# Patient Record
Sex: FEMALE | Race: WHITE | NOT HISPANIC OR LATINO | URBAN - METROPOLITAN AREA
[De-identification: names, ages, dates, MRNs, and addresses within clinical notes are randomized per-mention and may not be internally consistent; named-entity substitution may affect disease eponyms.]

---

## 2024-01-05 ENCOUNTER — HOSPITAL ENCOUNTER (EMERGENCY)
Facility: HOSPITAL | Age: 29
Discharge: HOME | End: 2024-01-05
Attending: EMERGENCY MEDICINE
Payer: MEDICAID

## 2024-01-05 VITALS
WEIGHT: 154.31 LBS | TEMPERATURE: 99 F | HEART RATE: 85 BPM | HEIGHT: 64 IN | OXYGEN SATURATION: 98 % | RESPIRATION RATE: 18 BRPM | SYSTOLIC BLOOD PRESSURE: 120 MMHG | BODY MASS INDEX: 26.34 KG/M2 | DIASTOLIC BLOOD PRESSURE: 69 MMHG

## 2024-01-05 DIAGNOSIS — J06.9 VIRAL URI WITH COUGH: ICD-10-CM

## 2024-01-05 DIAGNOSIS — J20.9 ACUTE BRONCHITIS, UNSPECIFIED ORGANISM: Primary | ICD-10-CM

## 2024-01-05 LAB
GROUP A STREP, MOLECULAR: NEGATIVE
INFLUENZA A, MOLECULAR: NEGATIVE
INFLUENZA B, MOLECULAR: NEGATIVE
SARS-COV-2 RDRP RESP QL NAA+PROBE: NEGATIVE
SPECIMEN SOURCE: NORMAL

## 2024-01-05 PROCEDURE — 99283 EMERGENCY DEPT VISIT LOW MDM: CPT

## 2024-01-05 PROCEDURE — 87651 STREP A DNA AMP PROBE: CPT

## 2024-01-05 PROCEDURE — U0002 COVID-19 LAB TEST NON-CDC: HCPCS

## 2024-01-05 PROCEDURE — 87502 INFLUENZA DNA AMP PROBE: CPT

## 2024-01-05 RX ORDER — BROMPHENIRAMINE MALEATE, PSEUDOEPHEDRINE HYDROCHLORIDE, AND DEXTROMETHORPHAN HYDROBROMIDE 2; 30; 10 MG/5ML; MG/5ML; MG/5ML
7.5 SYRUP ORAL EVERY 6 HOURS PRN
Qty: 118 ML | Refills: 0 | Status: SHIPPED | OUTPATIENT
Start: 2024-01-05 | End: 2024-01-15

## 2024-01-05 NOTE — Clinical Note
"Sierra Moralesssica" Lucrecia was seen and treated in our emergency department on 1/5/2024.  She may return to work on 01/08/2024.       If you have any questions or concerns, please don't hesitate to call.      Montrell Hickman, NP"

## 2024-01-05 NOTE — ED PROVIDER NOTES
Encounter Date: 1/5/2024       History     Chief Complaint   Patient presents with    General Illness     This note is dictated on M*Modal word recognition program.  There are word recognition mistakes and grammatical errors that are occasionally missed on review.     Sierra Singer is a 28 y.o. female presents to ER today with complaints of body aches, chills, nasal congestion, fever, coughing for the past 2 weeks on and off.  Patient reports a week ago she was treated with amoxicillin, steroids for upper respiratory infection at outside facility.  Patient reports she felt a little better however in the last 48 hours symptoms re-emerged.  Patient is currently afebrile today.    The history is provided by the patient.     Review of patient's allergies indicates:  No Known Allergies  History reviewed. No pertinent past medical history.  No past surgical history on file.  History reviewed. No pertinent family history.     Review of Systems   Constitutional:  Positive for chills, fatigue and fever.   HENT:  Positive for congestion and sore throat.    Eyes: Negative.    Respiratory:  Positive for cough.    Gastrointestinal: Negative.    Endocrine: Negative.    Genitourinary: Negative.    Musculoskeletal: Negative.    Allergic/Immunologic: Negative.    Neurological: Negative.    Hematological: Negative.    Psychiatric/Behavioral: Negative.         Physical Exam     Initial Vitals [01/05/24 1405]   BP Pulse Resp Temp SpO2   120/69 85 18 98.9 °F (37.2 °C) 98 %      MAP       --         Physical Exam    Constitutional: She appears well-developed and well-nourished. She is not diaphoretic. No distress.   HENT:   Right Ear: External ear normal.   Left Ear: External ear normal.   Eyes: Pupils are equal, round, and reactive to light. Right eye exhibits no discharge. Left eye exhibits no discharge.   Neck: Neck supple.   Normal range of motion.  Cardiovascular:  Normal rate, regular rhythm and normal heart sounds.     Exam reveals  no gallop and no friction rub.       No murmur heard.  Pulmonary/Chest: Breath sounds normal. No respiratory distress. She has no rhonchi. She exhibits no tenderness.   Abdominal: Abdomen is soft.   Musculoskeletal:         General: No edema.      Cervical back: Normal range of motion and neck supple.     Neurological: She is alert and oriented to person, place, and time. She displays normal reflexes. No cranial nerve deficit. GCS score is 15. GCS eye subscore is 4. GCS verbal subscore is 5. GCS motor subscore is 6.   Skin: Capillary refill takes less than 2 seconds. No rash noted. No erythema.   Psychiatric: She has a normal mood and affect. Her behavior is normal. Judgment and thought content normal.         ED Course   Procedures  Labs Reviewed   INFLUENZA A & B BY MOLECULAR   GROUP A STREP, MOLECULAR   SARS-COV-2 RNA AMPLIFICATION, QUAL          Imaging Results    None          Medications - No data to display  Medical Decision Making  Differential diagnosis include COVID-19, influenza, strep, acute bacterial upper respiratory infection, viral upper respiratory infection, acute bronchitis     Patient tested negative for strep, COVID-19, influenza today.  Patient's symptoms are consistent with bronchitis versus viral upper respiratory infection.  Patient was recently treated with amoxicillin with no relief of symptoms leading to diagnosis being more consistent with viral infection.  Will treat patient symptomatically and provide Bromfed to help with cough.  Patient instructed to take Tylenol and Motrin to help with body aches, headaches, sore throat, fevers.  Patient instructed to follow-up package directions.    Patient stable at time of discharge in no acute distress.  No life-threatening illnesses were found during ER visit today.  Patient was instructed to follow-up with PCP or other recommended specialist within the next 48-72 hours.  Patient was instructed to return to ER immediately for any worsening or  concerning symptoms.  All discharge instructions discussed with patient, and patient agrees to comply with discharge instructions given today.     Risk  Prescription drug management.                                      Clinical Impression:  Final diagnoses:  [J06.9] Viral URI with cough  [J20.9] Acute bronchitis, unspecified organism (Primary)          ED Disposition Condition    Discharge Stable          ED Prescriptions       Medication Sig Dispense Start Date End Date Auth. Provider    brompheniramine-pseudoeph-DM (BROMFED DM) 2-30-10 mg/5 mL Syrp Take 7.5 mLs by mouth every 6 (six) hours as needed (cough). Dx CODE J20.9 118 mL 1/5/2024 1/15/2024 Montrell Hickman, ANIL          Follow-up Information    None          Montrell Hickman, NP  01/05/24 9800

## 2024-07-12 ENCOUNTER — HOSPITAL ENCOUNTER (EMERGENCY)
Facility: HOSPITAL | Age: 29
Discharge: HOME | End: 2024-07-12
Attending: FAMILY MEDICINE
Payer: MEDICAID

## 2024-07-12 VITALS
WEIGHT: 140.56 LBS | SYSTOLIC BLOOD PRESSURE: 117 MMHG | HEART RATE: 53 BPM | BODY MASS INDEX: 24 KG/M2 | OXYGEN SATURATION: 100 % | RESPIRATION RATE: 22 BRPM | DIASTOLIC BLOOD PRESSURE: 76 MMHG | TEMPERATURE: 98 F | HEIGHT: 64 IN

## 2024-07-12 DIAGNOSIS — R07.89 ATYPICAL CHEST PAIN: Primary | ICD-10-CM

## 2024-07-12 DIAGNOSIS — R00.1 BRADYCARDIA: ICD-10-CM

## 2024-07-12 DIAGNOSIS — R07.9 CHEST PAIN: ICD-10-CM

## 2024-07-12 LAB
ALBUMIN SERPL BCP-MCNC: 4.5 G/DL (ref 3.5–5.2)
ALP SERPL-CCNC: 86 U/L (ref 55–135)
ALT SERPL W/O P-5'-P-CCNC: 10 U/L (ref 10–44)
ANION GAP SERPL CALC-SCNC: 8 MMOL/L (ref 8–16)
AST SERPL-CCNC: 15 U/L (ref 10–40)
B-HCG UR QL: NEGATIVE
BACTERIA #/AREA URNS HPF: ABNORMAL /HPF
BASOPHILS # BLD AUTO: 0.02 K/UL (ref 0–0.2)
BASOPHILS NFR BLD: 0.3 % (ref 0–1.9)
BILIRUB SERPL-MCNC: 0.8 MG/DL (ref 0.1–1)
BILIRUB UR QL STRIP: NEGATIVE
BUN SERPL-MCNC: 10 MG/DL (ref 6–20)
CALCIUM SERPL-MCNC: 9.6 MG/DL (ref 8.7–10.5)
CHLORIDE SERPL-SCNC: 105 MMOL/L (ref 95–110)
CLARITY UR: CLEAR
CO2 SERPL-SCNC: 27 MMOL/L (ref 23–29)
COLOR UR: YELLOW
CREAT SERPL-MCNC: 0.7 MG/DL (ref 0.5–1.4)
D DIMER PPP IA.FEU-MCNC: 0.31 MG/L FEU
DIFFERENTIAL METHOD BLD: NORMAL
EOSINOPHIL # BLD AUTO: 0.1 K/UL (ref 0–0.5)
EOSINOPHIL NFR BLD: 1 % (ref 0–8)
ERYTHROCYTE [DISTWIDTH] IN BLOOD BY AUTOMATED COUNT: 12.2 % (ref 11.5–14.5)
EST. GFR  (NO RACE VARIABLE): >60 ML/MIN/1.73 M^2
GLUCOSE SERPL-MCNC: 88 MG/DL (ref 70–110)
GLUCOSE UR QL STRIP: NEGATIVE
HCT VFR BLD AUTO: 40.1 % (ref 37–48.5)
HGB BLD-MCNC: 13.9 G/DL (ref 12–16)
HGB UR QL STRIP: NEGATIVE
HYALINE CASTS #/AREA URNS LPF: 1 /LPF
IMM GRANULOCYTES # BLD AUTO: 0.01 K/UL (ref 0–0.04)
IMM GRANULOCYTES NFR BLD AUTO: 0.2 % (ref 0–0.5)
INFLUENZA A, MOLECULAR: NEGATIVE
INFLUENZA B, MOLECULAR: NEGATIVE
KETONES UR QL STRIP: NEGATIVE
LEUKOCYTE ESTERASE UR QL STRIP: ABNORMAL
LYMPHOCYTES # BLD AUTO: 1.6 K/UL (ref 1–4.8)
LYMPHOCYTES NFR BLD: 24.9 % (ref 18–48)
MCH RBC QN AUTO: 30.8 PG (ref 27–31)
MCHC RBC AUTO-ENTMCNC: 34.7 G/DL (ref 32–36)
MCV RBC AUTO: 89 FL (ref 82–98)
MICROSCOPIC COMMENT: ABNORMAL
MONOCYTES # BLD AUTO: 0.4 K/UL (ref 0.3–1)
MONOCYTES NFR BLD: 6.6 % (ref 4–15)
NEUTROPHILS # BLD AUTO: 4.2 K/UL (ref 1.8–7.7)
NEUTROPHILS NFR BLD: 67 % (ref 38–73)
NITRITE UR QL STRIP: NEGATIVE
NRBC BLD-RTO: 0 /100 WBC
OHS QRS DURATION: 72 MS
OHS QTC CALCULATION: 391 MS
PH UR STRIP: 8 [PH] (ref 5–8)
PLATELET # BLD AUTO: 252 K/UL (ref 150–450)
PMV BLD AUTO: 9.5 FL (ref 9.2–12.9)
POTASSIUM SERPL-SCNC: 4 MMOL/L (ref 3.5–5.1)
PROT SERPL-MCNC: 7.6 G/DL (ref 6–8.4)
PROT UR QL STRIP: NEGATIVE
RBC # BLD AUTO: 4.51 M/UL (ref 4–5.4)
RBC #/AREA URNS HPF: 0 /HPF (ref 0–4)
SARS-COV-2 RDRP RESP QL NAA+PROBE: NEGATIVE
SODIUM SERPL-SCNC: 140 MMOL/L (ref 136–145)
SP GR UR STRIP: 1.02 (ref 1–1.03)
SPECIMEN SOURCE: NORMAL
SQUAMOUS #/AREA URNS HPF: 12 /HPF
TROPONIN I SERPL DL<=0.01 NG/ML-MCNC: <0.006 NG/ML (ref 0–0.03)
TROPONIN I SERPL DL<=0.01 NG/ML-MCNC: <0.006 NG/ML (ref 0–0.03)
URN SPEC COLLECT METH UR: ABNORMAL
UROBILINOGEN UR STRIP-ACNC: NEGATIVE EU/DL
WBC # BLD AUTO: 6.22 K/UL (ref 3.9–12.7)
WBC #/AREA URNS HPF: 3 /HPF (ref 0–5)

## 2024-07-12 PROCEDURE — 85379 FIBRIN DEGRADATION QUANT: CPT | Performed by: FAMILY MEDICINE

## 2024-07-12 PROCEDURE — 93010 ELECTROCARDIOGRAM REPORT: CPT | Mod: ,,, | Performed by: INTERNAL MEDICINE

## 2024-07-12 PROCEDURE — 84484 ASSAY OF TROPONIN QUANT: CPT | Performed by: FAMILY MEDICINE

## 2024-07-12 PROCEDURE — 80053 COMPREHEN METABOLIC PANEL: CPT | Performed by: FAMILY MEDICINE

## 2024-07-12 PROCEDURE — 99284 EMERGENCY DEPT VISIT MOD MDM: CPT | Mod: 25

## 2024-07-12 PROCEDURE — 93005 ELECTROCARDIOGRAM TRACING: CPT

## 2024-07-12 PROCEDURE — U0002 COVID-19 LAB TEST NON-CDC: HCPCS | Performed by: FAMILY MEDICINE

## 2024-07-12 PROCEDURE — 81025 URINE PREGNANCY TEST: CPT | Performed by: FAMILY MEDICINE

## 2024-07-12 PROCEDURE — 85025 COMPLETE CBC W/AUTO DIFF WBC: CPT | Performed by: FAMILY MEDICINE

## 2024-07-12 PROCEDURE — 87502 INFLUENZA DNA AMP PROBE: CPT | Performed by: FAMILY MEDICINE

## 2024-07-12 PROCEDURE — 81000 URINALYSIS NONAUTO W/SCOPE: CPT | Performed by: FAMILY MEDICINE

## 2024-07-12 PROCEDURE — 94760 N-INVAS EAR/PLS OXIMETRY 1: CPT

## 2024-07-12 NOTE — CONSULTS
[facility] - Emergency Dept  Cardiology  Consult Note    Patient Name: Sierra Singer  MRN: [id number]  Admission Date: 7/12/2024  Hospital Length of Stay: 0 days  Code Status: No Order   Consulting Provider: Shawn Gilbert NP  Primary Care Physician: No, Primary Doctor  Principal Problem:<principal problem not specified>      Inpatient consult to Cardiology-C[facility]  Consult performed by: Shawn Gilbert NP  Consult ordered by: Mi Peraza MD        Subjective:     Chief Complaint:  CP    HPI:  28-year-old female with past medical history of irregular heartbeat presents to emergency department after episode of syncope yesterday and continuous chest pain.  Labs done rather benign.  Second troponin pending.  Patient takes metoprolol of unknown dose for her history of irregular heartbeat.  Cis asked to evaluate.    No past medical history on file.    No past surgical history on file.    Review of patient's allergies indicates:  No Known Allergies    No current facility-administered medications on file prior to encounter.     No current outpatient medications on file prior to encounter.     Family History    None       Tobacco Use    Smoking status: Not on file    Smokeless tobacco: Not on file   Substance and Sexual Activity    Alcohol use: Not on file    Drug use: Not on file    Sexual activity: Not on file     Review of Systems   Constitutional: Negative.   HENT: Negative.     Cardiovascular:  Positive for chest pain.   Respiratory: Negative.     Skin: Negative.    Musculoskeletal: Negative.    Gastrointestinal: Negative.    Genitourinary: Negative.    Neurological: Negative.    Psychiatric/Behavioral: Negative.       Objective:     Vital Signs (Most Recent):  Temp: 98.2 °F (36.8 °C) (07/12/24 1039)  Pulse: (!) 52 (07/12/24 1345)  Resp: 17 (07/12/24 1345)  BP: 112/79 (07/12/24 1345)  SpO2: 100 % (07/12/24 1345) Vital Signs (24h Range):  Temp:  [98.2 °F (36.8 °C)] 98.2 °F (36.8 °C)  Pulse:  [49-68] 52  Resp:   [17-20] 17  SpO2:  [99 %-100 %] 100 %  BP: (112-136)/(75-93) 112/79     Weight: 63.7 kg (140 lb 8.7 oz)  Body mass index is 24.12 kg/m².    SpO2: 100 %       No intake or output data in the 24 hours ending 07/12/24 1444    Lines/Drains/Airways       Peripheral Intravenous Line  Duration                  Peripheral IV - Single Lumen 07/12/24 1156 18 G Anterior;Proximal;Right Forearm <1 day                    Physical Exam  HENT:      Nose: Nose normal.      Mouth/Throat:      Mouth: Mucous membranes are moist.   Cardiovascular:      Rate and Rhythm: Regular rhythm. Bradycardia present.      Pulses: Normal pulses.      Heart sounds: Normal heart sounds.   Pulmonary:      Effort: Pulmonary effort is normal.      Breath sounds: Normal breath sounds.   Abdominal:      General: Abdomen is flat.   Musculoskeletal:         General: Normal range of motion.      Cervical back: Normal range of motion.   Skin:     General: Skin is warm and dry.      Capillary Refill: Capillary refill takes less than 2 seconds.   Neurological:      Mental Status: She is alert and oriented to person, place, and time.   Psychiatric:         Mood and Affect: Mood normal.         Significant Labs: BMP:   Recent Labs   Lab 07/12/24  1123   GLU 88      K 4.0      CO2 27   BUN 10   CREATININE 0.7   CALCIUM 9.6   , CMP   Recent Labs   Lab 07/12/24  1123      K 4.0      CO2 27   GLU 88   BUN 10   CREATININE 0.7   CALCIUM 9.6   PROT 7.6   ALBUMIN 4.5   BILITOT 0.8   ALKPHOS 86   AST 15   ALT 10   ANIONGAP 8   , CBC   Recent Labs   Lab 07/12/24  1123   WBC 6.22   HGB 13.9   HCT 40.1      , and Troponin   Recent Labs   Lab 07/12/24  1123   TROPONINI <0.006         Assessment and Plan:     There are no hospital problems to display for this patient.      VTE Risk Mitigation (From admission, onward)      None            Diagnosis:   Chest pain   Back pain   Sinus bradycardia     Plan:   Patient's chest pain is somewhat atypical  as it is reproducible with deep inspiration and movement.    However, complaints of substernal tightness.    If 2nd set of troponin negative, patient can follow-up as an outpatient.    We will arrange for a 48 hour Holter monitor upon discharge to assess for degree of bradycardia as her heart rate did get into the 40s periodically during her ER visit.  We will also schedule ETT given chest pain.    Echocardiogram to assess LV function and valvular structure.    She can follow-up with Dr Zhang after above testing for results.    Shawn Gilbert NP  Cardiology   [facility] - Emergency Dept

## 2024-07-12 NOTE — Clinical Note
"Sierra"Romina Singer was seen and treated in our emergency department on 7/12/2024.  She may return to work on 07/15/2024.       If you have any questions or concerns, please don't hesitate to call.      Mi Peraza MD"

## 2024-07-12 NOTE — ED PROVIDER NOTES
Encounter Date: 7/12/2024       History     Chief Complaint   Patient presents with    Chest Pain     Patient to ER CC of a chest pressure that started about a hour ago, states its starting to make her SOB      HPI  28 year old female with a history of arrhythmia presents to the ED with chest pressure that started approximately 1 hour prior to presentation.  Patient states that the symptoms were starting to make her feel short of breath.  She does state that she is on metoprolol for an arrhythmia which was started in [place].  She does state that the shortness of breath has minimally improved however she has still having the chest pressure.  She denies any nausea, vomiting, diarrhea.  No fevers, chills, known sick contacts.  Review of patient's allergies indicates:  No Known Allergies  No past medical history on file.  No past surgical history on file.  No family history on file.     Review of Systems   Constitutional:  Negative for chills and fever.   HENT:  Negative for sore throat.    Eyes:  Negative for visual disturbance.   Respiratory:  Positive for shortness of breath. Negative for wheezing.    Cardiovascular:  Positive for chest pain.   Gastrointestinal:  Negative for abdominal pain, diarrhea, nausea and vomiting.   Genitourinary:  Negative for dysuria.   Musculoskeletal:  Negative for back pain.   Neurological:  Negative for facial asymmetry.   Psychiatric/Behavioral:  Negative for behavioral problems.    All other systems reviewed and are negative.      Physical Exam     Initial Vitals [07/12/24 1039]   BP Pulse Resp Temp SpO2   (!) 136/93 68 18 98.2 °F (36.8 °C) 99 %      MAP       --         Physical Exam    Constitutional: She appears well-developed and well-nourished. She is not diaphoretic. No distress.   HENT:   Head: Normocephalic and atraumatic.   Right Ear: External ear normal.   Left Ear: External ear normal.   Eyes: EOM are normal. Pupils are equal, round, and reactive to light.   Neck:    Normal range of motion.  Cardiovascular:            S1, S2, no murmurs, bradycardia   Pulmonary/Chest: No respiratory distress. She has no wheezes. She has no rales.   Abdominal: Abdomen is soft. She exhibits no distension. There is no abdominal tenderness.   Musculoskeletal:         General: Normal range of motion.      Cervical back: Normal range of motion.     Neurological: She is alert and oriented to person, place, and time. GCS score is 15. GCS eye subscore is 4. GCS verbal subscore is 5. GCS motor subscore is 6.   Skin: Skin is warm and dry. No rash noted.   Psychiatric: She has a normal mood and affect.         ED Course   Procedures  Labs Reviewed   URINALYSIS, REFLEX TO URINE CULTURE - Abnormal; Notable for the following components:       Result Value    Leukocytes, UA 1+ (*)     All other components within normal limits    Narrative:     Specimen Source->Urine   URINALYSIS MICROSCOPIC - Abnormal; Notable for the following components:    Bacteria Few (*)     All other components within normal limits    Narrative:     Specimen Source->Urine   INFLUENZA A & B BY MOLECULAR   SARS-COV-2 RNA AMPLIFICATION, QUAL   CBC W/ AUTO DIFFERENTIAL   COMPREHENSIVE METABOLIC PANEL   TROPONIN I   PREGNANCY TEST, URINE RAPID    Narrative:     Specimen Source->Urine   D DIMER, QUANTITATIVE   TROPONIN I        ECG Results              EKG 12-lead (Final result)        Collection Time Result Time QRS Duration OHS QTC Calculation    07/12/24 10:41:10 07/12/24 13:19:33 72 391                     Final result by Interface, [facility] (07/12/24 13:19:44)                   Narrative:    Test Reason : R07.9    Vent. Rate : 062 BPM     Atrial Rate : 062 BPM     P-R Int : 132 ms          QRS Dur : 072 ms      QT Int : 386 ms       P-R-T Axes : 056 065 071 degrees     QTc Int : 391 ms    Normal sinus rhythm with sinus arrhythmia  Normal ECG  No previous ECGs available  Confirmed by Dewey Garrison MD ([id number]) on 7/12/2024 1:19:28  PM    Referred By: [email or web address]   SELF           Confirmed By:Dewey Garrison MD                                  Imaging Results              X-Ray Chest 1 View (Final result)  Result time 07/12/24 12:22:39      Final result by Alber Pollack MD (07/12/24 12:22:39)                   Impression:      No active cardiopulmonary process.      Electronically signed by: Alber Pollack MD  Date:    07/12/2024  Time:    12:22               Narrative:    EXAMINATION:  XR CHEST 1 VIEW    CLINICAL HISTORY:  Chest pain, unspecified    TECHNIQUE:  Single frontal view of the chest was performed.    COMPARISON:  None    FINDINGS:  The cardiomediastinal silhouette is within normal limits.  The lungs are well expanded without consolidation or pleural effusion.  There is dextrocurvature of the thoracic spine.                                       Medications - No data to display  Medical Decision Making  28-year-old female who presents to the ED with chest pain proximally 1 hour prior to presentation as well as shortness of breath.  Patient with known history of arrhythmia and is on metoprolol.  Patient was seen by Cardiology who agrees with 2nd set of cardiac enzymes which were both negative troponins.  Labs reviewed.  EKG reviewed.  Chest x-ray reviewed.  Patient seen by Sae KENDALL nurse practitioner.  She will be discharged home with immediately following in their clinic today for Holter monitor placement.  Patient understands plan of care and discharge instructions.  All questions answered prior to discharge home.    Amount and/or Complexity of Data Reviewed  Labs: ordered.  Radiology: ordered.               ED Course as of 07/12/24 1546   Fri Jul 12, 2024   1110 EKG  Normal sinus rhythm with sinus arrhythmia  Heart rate 62  QRS normal  No acute ST elevation  EKG reviewed interpreted by myself [FP]      ED Course User Index  [FP] Mi Peraza MD                           Clinical Impression:  Final  diagnoses:  [R07.9] Chest pain  [R07.89] Atypical chest pain (Primary)  [R00.1] Bradycardia          ED Disposition Condition    Discharge Stable          ED Prescriptions    None       Follow-up Information       Follow up With Specialties Details Why Contact Info    Juan Antonio Zhang MD Cardiology Today please go immediately upon discharge [address] [place] [address]  [phone]               Mi Peraza MD  07/12/24 8222

## 2024-07-12 NOTE — DISCHARGE INSTRUCTIONS
Please return the ED with any worsened chest pain, shortness of breath.  Please follow-up immediately with Dr. Zhang's office across the street.  They will be providing you with a cardiac monitor.

## 2024-07-17 ENCOUNTER — OFFICE VISIT (OUTPATIENT)
Dept: CARDIOLOGY | Facility: CLINIC | Age: 29
End: 2024-07-17
Payer: MEDICAID

## 2024-07-17 VITALS
OXYGEN SATURATION: 99 % | HEIGHT: 64 IN | HEART RATE: 62 BPM | SYSTOLIC BLOOD PRESSURE: 122 MMHG | RESPIRATION RATE: 18 BRPM | DIASTOLIC BLOOD PRESSURE: 78 MMHG | WEIGHT: 143 LBS | BODY MASS INDEX: 24.41 KG/M2

## 2024-07-17 DIAGNOSIS — R55 SYNCOPE AND COLLAPSE: ICD-10-CM

## 2024-07-17 DIAGNOSIS — R07.9 CHEST PAIN, UNSPECIFIED TYPE: ICD-10-CM

## 2024-07-17 DIAGNOSIS — Z82.49 FAMILY HISTORY OF CORONARY ARTERY DISEASE IN FATHER: ICD-10-CM

## 2024-07-17 DIAGNOSIS — I49.8 SINUS ARRHYTHMIA SEEN ON ELECTROCARDIOGRAPHY: Primary | ICD-10-CM

## 2024-07-17 PROCEDURE — 99213 OFFICE O/P EST LOW 20 MIN: CPT | Mod: PBBFAC | Performed by: NURSE PRACTITIONER

## 2024-07-17 PROCEDURE — 3074F SYST BP LT 130 MM HG: CPT | Mod: CPTII,,, | Performed by: NURSE PRACTITIONER

## 2024-07-17 PROCEDURE — 1160F RVW MEDS BY RX/DR IN RCRD: CPT | Mod: CPTII,,, | Performed by: NURSE PRACTITIONER

## 2024-07-17 PROCEDURE — 1159F MED LIST DOCD IN RCRD: CPT | Mod: CPTII,,, | Performed by: NURSE PRACTITIONER

## 2024-07-17 PROCEDURE — 3008F BODY MASS INDEX DOCD: CPT | Mod: CPTII,,, | Performed by: NURSE PRACTITIONER

## 2024-07-17 PROCEDURE — 99999 PR PBB SHADOW E&M-EST. PATIENT-LVL III: CPT | Mod: PBBFAC,,, | Performed by: NURSE PRACTITIONER

## 2024-07-17 PROCEDURE — 3078F DIAST BP <80 MM HG: CPT | Mod: CPTII,,, | Performed by: NURSE PRACTITIONER

## 2024-07-17 PROCEDURE — 99204 OFFICE O/P NEW MOD 45 MIN: CPT | Mod: S$PBB,,, | Performed by: NURSE PRACTITIONER

## 2024-07-17 NOTE — PROGRESS NOTES
"[facility]    CC: Arrhythmias     Patient ID: Sierra Singer is a 28 y.o. female with a past medical history of atypical chest pain      HPI  ED visit for atypical chest pain x2 years  Nonexertional chest pain   Associated with lightheadeness and dizziness, back pain, nausea, tingling of toes and fingers     ER note 7/2024  "28 year old female with a history of arrhythmia presents to the ED with chest pressure that started approximately 1 hour prior to presentation.  Patient states that the symptoms were starting to make her feel short of breath.  She does state that she is on metoprolol for an arrhythmia which was started in [place].  She does state that the shortness of breath has minimally improved however she has still having the chest pressure.  She denies any nausea, vomiting, diarrhea.  No fevers, chills, known sick contacts."    Medical Decision Making  28-year-old female who presents to the ED with chest pain proximally 1 hour prior to presentation as well as shortness of breath.  Patient with known history of arrhythmia and is on metoprolol.  Patient was seen by Cardiology who agrees with 2nd set of cardiac enzymes which were both negative troponins.  Labs reviewed.  EKG reviewed.  Chest x-ray reviewed.  Patient seen by Sae KENDALL nurse practitioner.  She will be discharged home with immediately following in their clinic today for Holter monitor placement.  Patient understands plan of care and discharge instructions.  All questions answered prior to discharge home.    Also reports syncope   Dizziness, nausea, lightheadedness prior   Passed out at work  Reports weight loss for the last 2 months, unintentional  No meds   Nonsmoker     Wore a monitor; turned in 2 days ago; not resulted yet    FH- dad- CAD with PCI      No past medical history on file.  No past surgical history on file.  Social History     Socioeconomic History    Marital status: Legally      No family history " "on file.    Review of patient's allergies indicates:   Allergen Reactions    Jarod Hives, Itching and Rash               Review of Systems   Constitutional: Positive for weight loss.   Cardiovascular:  Positive for chest pain, irregular heartbeat, palpitations and syncope. Negative for claudication, cyanosis, dyspnea on exertion, leg swelling, near-syncope, orthopnea and paroxysmal nocturnal dyspnea.   Respiratory: Negative.     Skin: Negative.    Musculoskeletal: Negative.        Vitals:    07/17/24 1009   BP: 122/78   Pulse: 62   Resp: 18   SpO2: 99%   Weight: 64.9 kg (143 lb)   Height: 5' 4" (1.626 m)          Physical Exam  Cardiovascular:      Rate and Rhythm: Normal rate and regular rhythm.      Pulses: Normal pulses.      Heart sounds: Normal heart sounds.   Pulmonary:      Effort: Pulmonary effort is normal.      Breath sounds: Normal breath sounds.   Skin:     General: Skin is warm.      Capillary Refill: Capillary refill takes less than 2 seconds.   Neurological:      Mental Status: She is alert and oriented to person, place, and time.   Psychiatric:         Mood and Affect: Mood normal.           Labs:  Most Recent Data  CBC:   Lab Results   Component Value Date    WBC 6.22 07/12/2024    HGB 13.9 07/12/2024    HCT 40.1 07/12/2024     07/12/2024    MCV 89 07/12/2024    RDW 12.2 07/12/2024     BMP:   Lab Results   Component Value Date     07/12/2024    K 4.0 07/12/2024     07/12/2024    CO2 27 07/12/2024    BUN 10 07/12/2024    CREATININE 0.7 07/12/2024    GLU 88 07/12/2024    CALCIUM 9.6 07/12/2024     LFTS;   Lab Results   Component Value Date    PROT 7.6 07/12/2024    ALBUMIN 4.5 07/12/2024    BILITOT 0.8 07/12/2024    AST 15 07/12/2024    ALKPHOS 86 07/12/2024    ALT 10 07/12/2024     COAGS: No results found for: "INR", "PROTIME", "PTT"  FLP: No results found for: "CHOL", "HDL", "LDLCALC", "TRIG", "CHOLHDL"  CARDIAC:   Lab Results   Component Value Date    TROPONINI <0.006 07/12/2024 "       Assessment/Plan:  Problem List Items Addressed This Visit    None  Visit Diagnoses       Sinus arrhythmia seen on electrocardiography    -  Primary    Syncope and collapse        Chest pain, unspecified type        Family history of coronary artery disease in father                Exercise stress given chest pain and syncopal episodes while at work  Echocardiogram to assess LV function and valvular structure     Follow-up 4 weeks      Total duration of face to face visit time 30 minutes.  Total time spent counseling greater than fifty percent of total visit time.  Counseling included discussion regarding imaging findings, diagnosis, possibilities, treatment options, risks and benefits.  The patient had many questions regarding the options and long-term effects.    Shayy Gaston, HENRYP-C  Cardiology Clinic  [facility]

## 2024-07-22 ENCOUNTER — HOSPITAL ENCOUNTER (OUTPATIENT)
Dept: PULMONOLOGY | Facility: HOSPITAL | Age: 29
Discharge: HOME | End: 2024-07-22
Attending: NURSE PRACTITIONER
Payer: MEDICAID

## 2024-07-22 DIAGNOSIS — I49.8 SINUS ARRHYTHMIA SEEN ON ELECTROCARDIOGRAPHY: ICD-10-CM

## 2024-07-22 DIAGNOSIS — R55 SYNCOPE AND COLLAPSE: ICD-10-CM

## 2024-07-22 DIAGNOSIS — R07.9 CHEST PAIN, UNSPECIFIED TYPE: ICD-10-CM

## 2024-07-22 PROCEDURE — 93306 TTE W/DOPPLER COMPLETE: CPT | Mod: 26,,, | Performed by: INTERNAL MEDICINE

## 2024-07-22 PROCEDURE — 93306 TTE W/DOPPLER COMPLETE: CPT

## 2024-07-22 PROCEDURE — 93016 CV STRESS TEST SUPVJ ONLY: CPT | Mod: ,,, | Performed by: INTERNAL MEDICINE

## 2024-07-22 PROCEDURE — 93018 CV STRESS TEST I&R ONLY: CPT | Mod: ,,, | Performed by: INTERNAL MEDICINE

## 2024-07-22 PROCEDURE — 93017 CV STRESS TEST TRACING ONLY: CPT

## 2024-07-23 LAB
CV STRESS BASE HR: 78 BPM
DIASTOLIC BLOOD PRESSURE: 62 MMHG
OHS CV CPX 1 MINUTE RECOVERY HEART RATE: 137 BPM
OHS CV CPX 85 PERCENT MAX PREDICTED HEART RATE MALE: 163
OHS CV CPX ESTIMATED METS: 9
OHS CV CPX MAX PREDICTED HEART RATE: 192
OHS CV CPX PATIENT IS FEMALE: 1
OHS CV CPX PATIENT IS MALE: 0
OHS CV CPX PEAK DIASTOLIC BLOOD PRESSURE: 68 MMHG
OHS CV CPX PEAK HEAR RATE: 176 BPM
OHS CV CPX PEAK RATE PRESSURE PRODUCT: NORMAL
OHS CV CPX PEAK SYSTOLIC BLOOD PRESSURE: 161 MMHG
OHS CV CPX PERCENT MAX PREDICTED HEART RATE ACHIEVED: 97
OHS CV CPX RATE PRESSURE PRODUCT PRESENTING: 9360
RA PRESSURE ESTIMATED: 3 MMHG
STRESS ECHO POST EXERCISE DUR MIN: 6 MINUTES
STRESS ECHO POST EXERCISE DUR SEC: 33 SECONDS
SYSTOLIC BLOOD PRESSURE: 120 MMHG

## 2024-07-25 ENCOUNTER — TELEPHONE (OUTPATIENT)
Dept: CARDIOLOGY | Facility: CLINIC | Age: 29
End: 2024-07-25
Payer: MEDICAID

## 2024-07-25 NOTE — TELEPHONE ENCOUNTER
----- Message from Shayy Gaston NP sent at 7/25/2024  3:17 PM CDT -----  Your echocardiogram is normal.

## 2024-07-25 NOTE — TELEPHONE ENCOUNTER
----- Message from Shayy Gaston NP sent at 7/25/2024  3:18 PM CDT -----  Stress testing is normal.

## 2024-08-19 PROBLEM — Z82.49 FAMILY HISTORY OF CORONARY ARTERY DISEASE IN FATHER: Status: ACTIVE | Noted: 2024-08-19

## 2024-08-19 PROBLEM — R55 SYNCOPE AND COLLAPSE: Status: ACTIVE | Noted: 2024-08-19

## 2024-08-19 PROBLEM — R07.9 CHEST PAIN: Status: ACTIVE | Noted: 2024-08-19

## 2024-08-19 PROBLEM — I49.8 SINUS ARRHYTHMIA SEEN ON ELECTROCARDIOGRAPHY: Status: ACTIVE | Noted: 2024-08-19

## 2024-08-21 ENCOUNTER — OFFICE VISIT (OUTPATIENT)
Dept: CARDIOLOGY | Facility: CLINIC | Age: 29
End: 2024-08-21
Payer: MEDICAID

## 2024-08-21 VITALS
BODY MASS INDEX: 24.59 KG/M2 | OXYGEN SATURATION: 98 % | WEIGHT: 144 LBS | DIASTOLIC BLOOD PRESSURE: 69 MMHG | RESPIRATION RATE: 18 BRPM | HEIGHT: 64 IN | SYSTOLIC BLOOD PRESSURE: 109 MMHG | HEART RATE: 60 BPM

## 2024-08-21 DIAGNOSIS — R07.9 CHEST PAIN, UNSPECIFIED TYPE: Primary | ICD-10-CM

## 2024-08-21 DIAGNOSIS — F41.9 ANXIETY: ICD-10-CM

## 2024-08-21 DIAGNOSIS — Z82.49 FAMILY HISTORY OF CORONARY ARTERY DISEASE IN FATHER: ICD-10-CM

## 2024-08-21 DIAGNOSIS — I49.8 SINUS ARRHYTHMIA SEEN ON ELECTROCARDIOGRAPHY: ICD-10-CM

## 2024-08-21 DIAGNOSIS — R55 SYNCOPE AND COLLAPSE: ICD-10-CM

## 2024-08-21 PROCEDURE — 3008F BODY MASS INDEX DOCD: CPT | Mod: CPTII,,, | Performed by: NURSE PRACTITIONER

## 2024-08-21 PROCEDURE — 3078F DIAST BP <80 MM HG: CPT | Mod: CPTII,,, | Performed by: NURSE PRACTITIONER

## 2024-08-21 PROCEDURE — 1160F RVW MEDS BY RX/DR IN RCRD: CPT | Mod: CPTII,,, | Performed by: NURSE PRACTITIONER

## 2024-08-21 PROCEDURE — 99213 OFFICE O/P EST LOW 20 MIN: CPT | Mod: PBBFAC | Performed by: NURSE PRACTITIONER

## 2024-08-21 PROCEDURE — 99999 PR PBB SHADOW E&M-EST. PATIENT-LVL III: CPT | Mod: PBBFAC,,, | Performed by: NURSE PRACTITIONER

## 2024-08-21 PROCEDURE — 1159F MED LIST DOCD IN RCRD: CPT | Mod: CPTII,,, | Performed by: NURSE PRACTITIONER

## 2024-08-21 PROCEDURE — 99214 OFFICE O/P EST MOD 30 MIN: CPT | Mod: S$PBB,,, | Performed by: NURSE PRACTITIONER

## 2024-08-21 PROCEDURE — 3074F SYST BP LT 130 MM HG: CPT | Mod: CPTII,,, | Performed by: NURSE PRACTITIONER

## 2024-08-21 NOTE — PROGRESS NOTES
"   Cardiology Clinic note    Subjective:   Patient ID:  Sierra Singer is a 28 y.o. female who presents for follow-up of atypical chest pain     HPI:   Sierra Singer  has no past medical history on file.    Hx atypical chest pain x2 years  Nonexertional chest pain   Associated with lightheadeness and dizziness, back pain, nausea, tingling of toes and fingers      ER note 7/2024  "28 year old female with a history of arrhythmia presents to the ED with chest pressure that started approximately 1 hour prior to presentation.  Patient states that the symptoms were starting to make her feel short of breath.  She does state that she is on metoprolol for an arrhythmia which was started in [place].  She does state that the shortness of breath has minimally improved however she has still having the chest pressure.  She denies any nausea, vomiting, diarrhea.  No fevers, chills, known sick contacts."     Medical Decision Making  28-year-old female who presents to the ED with chest pain proximally 1 hour prior to presentation as well as shortness of breath.  Patient with known history of arrhythmia and is on metoprolol.  Patient was seen by Cardiology who agrees with 2nd set of cardiac enzymes which were both negative troponins.  Labs reviewed.  EKG reviewed.  Chest x-ray reviewed.  Patient seen by Sae KENDALL nurse practitioner.  She will be discharged home with immediately following in their clinic today for Holter monitor placement.  Patient understands plan of care and discharge instructions.  All questions answered prior to discharge home.    Echocardiogram and exercise stress test negative July 2024     Also reports syncope   Dizziness, nausea, lightheadedness prior   Passed out at work  Reports weight loss for the last 2 months, unintentional  No meds   Nonsmoker   Reports significant stressors     Wore a monitor with [facility] 7/2024; normal    FH- dad- CAD with PCI      Patient Active Problem List    Diagnosis Date " "Noted    Anxiety 08/21/2024    Syncope and collapse 08/19/2024    Chest pain 08/19/2024    Family history of coronary artery disease in father 08/19/2024    Sinus arrhythmia seen on electrocardiography 08/19/2024       Patient's Medications    No medications on file        Review of Systems   Constitutional: Positive for weight loss.   Cardiovascular:  Positive for chest pain, irregular heartbeat, palpitations and syncope. Negative for claudication, cyanosis, dyspnea on exertion, leg swelling, near-syncope, orthopnea and paroxysmal nocturnal dyspnea.   Respiratory: Negative.     Skin: Negative.    Musculoskeletal: Negative.          Objective:   Vitals  Vitals:    08/21/24 1422   BP: 109/69   Pulse: 60   Resp: 18   SpO2: 98%   Weight: 65.3 kg (144 lb)   Height: 5' 4" (1.626 m)          Physical Exam  Cardiovascular:      Rate and Rhythm: Normal rate and regular rhythm.      Pulses: Normal pulses.      Heart sounds: Normal heart sounds.   Pulmonary:      Effort: Pulmonary effort is normal.      Breath sounds: Normal breath sounds.   Skin:     General: Skin is warm.      Capillary Refill: Capillary refill takes less than 2 seconds.   Neurological:      Mental Status: She is alert and oriented to person, place, and time.   Psychiatric:         Mood and Affect: Mood normal.           Lab Results    Lab Results   Component Value Date    WBC 6.22 07/12/2024    HGB 13.9 07/12/2024    HCT 40.1 07/12/2024    MCV 89 07/12/2024       Lab Results   Component Value Date     07/12/2024       Lab Results   Component Value Date    K 4.0 07/12/2024    BUN 10 07/12/2024    CREATININE 0.7 07/12/2024       Lab Results   Component Value Date    GLU 88 07/12/2024       Lab Results   Component Value Date    AST 15 07/12/2024    ALT 10 07/12/2024    ALBUMIN 4.5 07/12/2024    PROT 7.6 07/12/2024         Assessment:     Problem List Items Addressed This Visit          Psychiatric    Anxiety       Cardiac/Vascular    Chest pain - " Primary    Family history of coronary artery disease in father    Sinus arrhythmia seen on electrocardiography       Other    Syncope and collapse       Plan:     Echo/stress testing normal   Atypical chest pain likely related increased stress/anxiety  We will refer to PCP for management    Continue with current medical plan and lifestyle changes.      Follow up in 1 year    Return sooner for concerns or questions. If symptoms persist go to the ED    She expressed verbal understanding and agreed with the plan    Thank you for the opportunity to care for this patient. Will be available for questions if needed.     Total duration of face to face visit time 30 minutes.  Total time spent counseling greater than fifty percent of total visit time.  Counseling included discussion regarding imaging findings, diagnosis, possibilities, treatment options, risks and benefits.    Shayy Gaston, ERNESTO-LUCERO  Cardiology Clinic  [facility]

## 2024-12-19 ENCOUNTER — HOSPITAL ENCOUNTER (OUTPATIENT)
Dept: RADIOLOGY | Facility: HOSPITAL | Age: 29
Discharge: HOME | End: 2024-12-19
Attending: NURSE PRACTITIONER
Payer: MEDICAID

## 2024-12-19 DIAGNOSIS — M54.9 DORSALGIA: Primary | ICD-10-CM

## 2024-12-19 DIAGNOSIS — M54.9 DORSALGIA: ICD-10-CM

## 2024-12-19 DIAGNOSIS — R10.13 DYSPEPSIA: ICD-10-CM

## 2024-12-19 DIAGNOSIS — R10.11 RUQ PAIN: ICD-10-CM

## 2024-12-19 PROCEDURE — 72070 X-RAY EXAM THORAC SPINE 2VWS: CPT | Mod: TC

## 2024-12-19 PROCEDURE — 72110 X-RAY EXAM L-2 SPINE 4/>VWS: CPT | Mod: TC

## 2024-12-19 PROCEDURE — 76705 ECHO EXAM OF ABDOMEN: CPT | Mod: TC

## 2024-12-31 ENCOUNTER — OFFICE VISIT (OUTPATIENT)
Dept: OBSTETRICS AND GYNECOLOGY | Facility: CLINIC | Age: 29
End: 2024-12-31
Payer: MEDICAID

## 2024-12-31 ENCOUNTER — OFFICE VISIT (OUTPATIENT)
Dept: SURGERY | Facility: CLINIC | Age: 29
End: 2024-12-31
Payer: MEDICAID

## 2024-12-31 VITALS
SYSTOLIC BLOOD PRESSURE: 111 MMHG | BODY MASS INDEX: 24.59 KG/M2 | WEIGHT: 144 LBS | RESPIRATION RATE: 18 BRPM | HEART RATE: 70 BPM | DIASTOLIC BLOOD PRESSURE: 70 MMHG | HEIGHT: 64 IN | OXYGEN SATURATION: 99 %

## 2024-12-31 VITALS
SYSTOLIC BLOOD PRESSURE: 106 MMHG | BODY MASS INDEX: 24.59 KG/M2 | DIASTOLIC BLOOD PRESSURE: 60 MMHG | HEIGHT: 64 IN | WEIGHT: 144 LBS | HEART RATE: 67 BPM

## 2024-12-31 DIAGNOSIS — Z12.4 CERVICAL CANCER SCREENING: ICD-10-CM

## 2024-12-31 DIAGNOSIS — Z01.818 PREOPERATIVE CLEARANCE: ICD-10-CM

## 2024-12-31 DIAGNOSIS — R30.0 DYSURIA: ICD-10-CM

## 2024-12-31 DIAGNOSIS — K80.50 BILIARY COLIC: ICD-10-CM

## 2024-12-31 DIAGNOSIS — K82.4 GALLBLADDER POLYP: Primary | ICD-10-CM

## 2024-12-31 DIAGNOSIS — Z01.419 ROUTINE GYNECOLOGICAL EXAMINATION: Primary | ICD-10-CM

## 2024-12-31 DIAGNOSIS — Z98.51 TUBAL LIGATION STATUS: ICD-10-CM

## 2024-12-31 LAB
BILIRUB UR QL STRIP: NEGATIVE
CLARITY UR: CLEAR
COLOR UR: YELLOW
GLUCOSE UR QL STRIP: NEGATIVE
HGB UR QL STRIP: NEGATIVE
KETONES UR QL STRIP: NEGATIVE
LEUKOCYTE ESTERASE UR QL STRIP: NEGATIVE
NITRITE UR QL STRIP: NEGATIVE
PH UR STRIP: 8 [PH] (ref 5–8)
PROT UR QL STRIP: NEGATIVE
SP GR UR STRIP: 1.01 (ref 1–1.03)
URN SPEC COLLECT METH UR: NORMAL
UROBILINOGEN UR STRIP-ACNC: 1 EU/DL

## 2024-12-31 PROCEDURE — 1160F RVW MEDS BY RX/DR IN RCRD: CPT | Mod: CPTII,,,

## 2024-12-31 PROCEDURE — 81003 URINALYSIS AUTO W/O SCOPE: CPT | Performed by: OBSTETRICS & GYNECOLOGY

## 2024-12-31 PROCEDURE — 99205 OFFICE O/P NEW HI 60 MIN: CPT | Mod: S$PBB,,,

## 2024-12-31 PROCEDURE — 1159F MED LIST DOCD IN RCRD: CPT | Mod: CPTII,,,

## 2024-12-31 PROCEDURE — 3008F BODY MASS INDEX DOCD: CPT | Mod: CPTII,,,

## 2024-12-31 PROCEDURE — 3074F SYST BP LT 130 MM HG: CPT | Mod: CPTII,,,

## 2024-12-31 PROCEDURE — 99213 OFFICE O/P EST LOW 20 MIN: CPT | Mod: PBBFAC,27 | Performed by: OBSTETRICS & GYNECOLOGY

## 2024-12-31 PROCEDURE — 3078F DIAST BP <80 MM HG: CPT | Mod: CPTII,,,

## 2024-12-31 PROCEDURE — 99999 PR PBB SHADOW E&M-EST. PATIENT-LVL III: CPT | Mod: PBBFAC,,, | Performed by: OBSTETRICS & GYNECOLOGY

## 2024-12-31 PROCEDURE — 99999 PR PBB SHADOW E&M-EST. PATIENT-LVL IV: CPT | Mod: PBBFAC,,,

## 2024-12-31 PROCEDURE — 99214 OFFICE O/P EST MOD 30 MIN: CPT | Mod: PBBFAC

## 2024-12-31 RX ORDER — SODIUM CHLORIDE 9 MG/ML
INJECTION, SOLUTION INTRAVENOUS CONTINUOUS
OUTPATIENT
Start: 2024-12-31

## 2024-12-31 RX ORDER — METHOCARBAMOL 500 MG/1
500 TABLET, FILM COATED ORAL EVERY 8 HOURS PRN
COMMUNITY
Start: 2024-11-21

## 2024-12-31 RX ORDER — TOPIRAMATE SPINKLE 25 MG/1
25 CAPSULE ORAL 2 TIMES DAILY
COMMUNITY

## 2024-12-31 RX ORDER — PANTOPRAZOLE SODIUM 20 MG/1
20 TABLET, DELAYED RELEASE ORAL DAILY
COMMUNITY

## 2024-12-31 RX ORDER — CEFAZOLIN SODIUM 2 G/50ML
2 SOLUTION INTRAVENOUS
OUTPATIENT
Start: 2024-12-31

## 2024-12-31 RX ORDER — METOPROLOL TARTRATE 50 MG/1
50 TABLET ORAL 2 TIMES DAILY
COMMUNITY

## 2024-12-31 RX ORDER — ONDANSETRON 4 MG/1
8 TABLET, ORALLY DISINTEGRATING ORAL EVERY 8 HOURS PRN
OUTPATIENT
Start: 2024-12-31

## 2024-12-31 RX ORDER — DICLOFENAC SODIUM 75 MG/1
75 TABLET, DELAYED RELEASE ORAL 2 TIMES DAILY
COMMUNITY

## 2024-12-31 RX ORDER — ONDANSETRON HYDROCHLORIDE 2 MG/ML
4 INJECTION, SOLUTION INTRAVENOUS EVERY 12 HOURS PRN
OUTPATIENT
Start: 2024-12-31

## 2024-12-31 RX ORDER — MELOXICAM 15 MG/1
15 TABLET ORAL DAILY PRN
COMMUNITY
Start: 2024-11-21

## 2024-12-31 NOTE — PROGRESS NOTES
"SUBJECTIVE:   29 y.o. female for annual routine Pap and checkup.  Complains of some pelvic and abdominal pain.  States that it might be due to her gallbladder which is slated for removal on January 22nd  Current Outpatient Medications   Medication Sig Dispense Refill    diclofenac (VOLTAREN) 75 MG EC tablet Take 75 mg by mouth 2 (two) times daily.      meloxicam (MOBIC) 15 MG tablet Take 15 mg by mouth daily as needed.      methocarbamoL (ROBAXIN) 500 MG Tab Take 500 mg by mouth every 8 (eight) hours as needed.      metoprolol tartrate (LOPRESSOR) 50 MG tablet Take 50 mg by mouth 2 (two) times daily.      pantoprazole (PROTONIX) 20 MG tablet Take 20 mg by mouth once daily.      topiramate 25 mg capsule Take 25 mg by mouth 2 (two) times daily.       No current facility-administered medications for this visit.     Allergies: [place]   Patient's last menstrual period was 12/16/2024.    ROS:  Feeling well. No dyspnea or chest pain on exertion.  No abdominal pain, change in bowel habits, black or bloody stools.  No urinary tract symptoms. GYN ROS: normal menses (but occasionally heavy), no abnormal bleeding, pelvic pain or discharge, no breast pain or new or enlarging lumps on self exam. No neurological complaints.    OBJECTIVE:   The patient appears well, alert, oriented x 3, in no distress.  /60   Pulse 67   Ht 5' 4" (1.626 m)   Wt 65.3 kg (144 lb)   LMP 12/16/2024   BMI 24.72 kg/m²   ENT normal.  Neck supple. No adenopathy or thyromegaly. ANDREA. Lungs are clear, good air entry, no wheezes, rhonchi or rales. S1 and S2 normal, no murmurs, regular rate and rhythm. Abdomen soft without tenderness, guarding, mass or organomegaly. Extremities show no edema, normal peripheral pulses. Neurological is normal, no focal findings.    BREAST EXAM: breasts appear normal, no suspicious masses, no skin or nipple changes or axillary nodes    PELVIC EXAM: VULVA: normal appearing vulva with no masses, tenderness or lesions, " VAGINA: normal appearing vagina with normal color and discharge, no lesions, CERVIX: normal appearing cervix without discharge or lesions, UTERUS: uterus is normal size, shape, consistency and nontender, ADNEXA: normal adnexa in size, nontender and no masses, PAP: Pap smear done today, thin-prep method    ASSESSMENT:   well woman  Tubal ligation status    PLAN:   pap smear  counseled on use and side effects of OCP's if needed to help control cycle  return annually or prn

## 2024-12-31 NOTE — H&P
[facility]  General Surgery Clinic H&P      Consult: right upper quadrant pain, pain radiating to the back, and nausea and/or vomiting after meals  Consulting Service: No ref. provider found  Chief Complaint: Abdominal pain        HPI: Pt is a 29 y.o.female who presents for right upper quadrant pain, pain radiating to the back, and nausea and/or vomiting after meals onset about 4 months ago, worsening over the last week. Reports emesis about 2x per week. Reports symptoms are worse with spicy foods.         PMH: No past medical history on file.  PSH: No past surgical history on file.  Meds: See medication list;  No anticoagulation  ALLERGIES: Jarod  FHX: see above   SOC:   Social History     Socioeconomic History    Marital status: Legally      Social Drivers of Health     Financial Resource Strain: Medium Risk (12/31/2024)    Overall Financial Resource Strain (CARDIA)     Difficulty of Paying Living Expenses: Somewhat hard   Food Insecurity: Patient Declined (12/31/2024)    Hunger Vital Sign     Worried About Running Out of Food in the Last Year: Patient declined     Ran Out of Food in the Last Year: Patient declined   Physical Activity: Sufficiently Active (12/31/2024)    Exercise Vital Sign     Days of Exercise per Week: 3 days     Minutes of Exercise per Session: 60 min   Stress: Stress Concern Present (12/31/2024)    [employer] [place] [employer] - Occupational Stress Questionnaire     Feeling of Stress : Rather much   Housing Stability: Unknown (12/31/2024)    Housing Stability Vital Sign     Unable to Pay for Housing in the Last Year: No     ROS: Review of Systems   Constitutional:  Negative for chills, diaphoresis, fever, malaise/fatigue and weight loss.   Respiratory:  Negative for cough, hemoptysis and shortness of breath.    Cardiovascular:  Negative for chest pain, palpitations and leg swelling.   Gastrointestinal:  Positive for abdominal pain, nausea and vomiting. Negative for  "blood in stool, constipation, diarrhea, heartburn and melena.   Musculoskeletal:  Positive for back pain.   All other systems reviewed and are negative.          Physical Exam:  /70 (BP Location: Left arm, Patient Position: Sitting)   Pulse 70   Resp 18   Ht 5' 4" (1.626 m)   Wt 65.3 kg (144 lb)   SpO2 99%   BMI 24.72 kg/m²   Physical Exam  Vitals reviewed.   Constitutional:       General: She is not in acute distress.     Appearance: Normal appearance. She is not ill-appearing, toxic-appearing or diaphoretic.   HENT:      Head: Normocephalic and atraumatic.      Mouth/Throat:      Mouth: Mucous membranes are moist.   Eyes:      Conjunctiva/sclera: Conjunctivae normal.   Cardiovascular:      Rate and Rhythm: Normal rate and regular rhythm.   Pulmonary:      Effort: Pulmonary effort is normal. No respiratory distress.   Abdominal:      General: There is no distension.      Palpations: Abdomen is soft.      Tenderness: There is abdominal tenderness in the right upper quadrant and epigastric area. There is no guarding or rebound.   Musculoskeletal:         General: Normal range of motion.      Cervical back: Normal range of motion.   Skin:     General: Skin is warm.      Capillary Refill: Capillary refill takes less than 2 seconds.   Neurological:      Mental Status: She is alert and oriented to person, place, and time. Mental status is at baseline.   Psychiatric:         Mood and Affect: Mood normal.         Behavior: Behavior normal.         Thought Content: Thought content normal.         Judgment: Judgment normal.           Imaging:       A/P: Pt is a 29 y.o.female who presents for right upper quadrant pain, pain radiating to the back, and nausea and/or vomiting after meals  --To OR on 1/20 for laparoscopic vs open cholecystectomy  --Procedure explained in detail to patient; including risks of but not limited to bile leak, CBD injury, retained stone, biloma, bleeding and infection discussed- patient " voiced understanding   --Consent obtained and signed  --Pre-op orders placed  --Instructions reviewed and given to patient   --NPO after midnight  --Reviewed external records  --Recent labs in media         Thierno Rivera NP  General Surgery   [phone]

## 2025-01-04 ENCOUNTER — HOSPITAL ENCOUNTER (OUTPATIENT)
Dept: RADIOLOGY | Facility: HOSPITAL | Age: 30
Discharge: HOME | End: 2025-01-04
Payer: MEDICAID

## 2025-01-04 DIAGNOSIS — Z01.818 PREOPERATIVE CLEARANCE: ICD-10-CM

## 2025-01-04 PROCEDURE — 71045 X-RAY EXAM CHEST 1 VIEW: CPT | Mod: TC

## 2025-01-07 ENCOUNTER — HOSPITAL ENCOUNTER (OUTPATIENT)
Facility: HOSPITAL | Age: 30
Discharge: HOME | End: 2025-01-08
Attending: FAMILY MEDICINE | Admitting: STUDENT IN AN ORGANIZED HEALTH CARE EDUCATION/TRAINING PROGRAM
Payer: MEDICAID

## 2025-01-07 DIAGNOSIS — R11.2 INTRACTABLE NAUSEA AND VOMITING: ICD-10-CM

## 2025-01-07 DIAGNOSIS — Z01.818 PRE-OP EXAM: ICD-10-CM

## 2025-01-07 DIAGNOSIS — K80.50 BILIARY COLIC: Primary | ICD-10-CM

## 2025-01-07 DIAGNOSIS — G89.18 POST-OPERATIVE PAIN: ICD-10-CM

## 2025-01-07 DIAGNOSIS — K82.4 GALLBLADDER POLYP: ICD-10-CM

## 2025-01-07 LAB
ALBUMIN SERPL BCP-MCNC: 4.5 G/DL (ref 3.5–5.2)
ALP SERPL-CCNC: 72 U/L (ref 55–135)
ALT SERPL W/O P-5'-P-CCNC: 12 U/L (ref 10–44)
ANION GAP SERPL CALC-SCNC: 9 MMOL/L (ref 8–16)
AST SERPL-CCNC: 22 U/L (ref 10–40)
BILIRUB SERPL-MCNC: 0.7 MG/DL (ref 0.1–1)
BUN SERPL-MCNC: 18 MG/DL (ref 6–20)
CALCIUM SERPL-MCNC: 9 MG/DL (ref 8.7–10.5)
CHLORIDE SERPL-SCNC: 108 MMOL/L (ref 95–110)
CO2 SERPL-SCNC: 23 MMOL/L (ref 23–29)
CREAT SERPL-MCNC: 0.9 MG/DL (ref 0.5–1.4)
ERYTHROCYTE [DISTWIDTH] IN BLOOD BY AUTOMATED COUNT: 12.1 % (ref 11.5–14.5)
EST. GFR  (NO RACE VARIABLE): >60 ML/MIN/1.73 M^2
GLUCOSE SERPL-MCNC: 84 MG/DL (ref 70–110)
HCT VFR BLD AUTO: 39.2 % (ref 37–48.5)
HGB BLD-MCNC: 13.6 G/DL (ref 12–16)
MCH RBC QN AUTO: 31 PG (ref 27–31)
MCHC RBC AUTO-ENTMCNC: 34.7 G/DL (ref 32–36)
MCV RBC AUTO: 89 FL (ref 82–98)
PLATELET # BLD AUTO: 240 K/UL (ref 150–450)
PMV BLD AUTO: 9.3 FL (ref 9.2–12.9)
POTASSIUM SERPL-SCNC: 3.4 MMOL/L (ref 3.5–5.1)
PROT SERPL-MCNC: 7.5 G/DL (ref 6–8.4)
RBC # BLD AUTO: 4.39 M/UL (ref 4–5.4)
SODIUM SERPL-SCNC: 140 MMOL/L (ref 136–145)
WBC # BLD AUTO: 8.68 K/UL (ref 3.9–12.7)

## 2025-01-07 PROCEDURE — 93010 ELECTROCARDIOGRAM REPORT: CPT | Mod: ,,, | Performed by: INTERNAL MEDICINE

## 2025-01-07 PROCEDURE — 36415 COLL VENOUS BLD VENIPUNCTURE: CPT | Performed by: STUDENT IN AN ORGANIZED HEALTH CARE EDUCATION/TRAINING PROGRAM

## 2025-01-07 PROCEDURE — G0378 HOSPITAL OBSERVATION PER HR: HCPCS

## 2025-01-07 PROCEDURE — 80053 COMPREHEN METABOLIC PANEL: CPT | Performed by: STUDENT IN AN ORGANIZED HEALTH CARE EDUCATION/TRAINING PROGRAM

## 2025-01-07 PROCEDURE — 85027 COMPLETE CBC AUTOMATED: CPT | Performed by: STUDENT IN AN ORGANIZED HEALTH CARE EDUCATION/TRAINING PROGRAM

## 2025-01-07 PROCEDURE — 93005 ELECTROCARDIOGRAM TRACING: CPT

## 2025-01-07 NOTE — Clinical Note
Diagnosis: Intractable nausea and vomiting [698382]   Future Attending Provider: MARBIN DIXON [[id number]]

## 2025-01-08 ENCOUNTER — ANESTHESIA (OUTPATIENT)
Dept: SURGERY | Facility: HOSPITAL | Age: 30
End: 2025-01-08
Payer: MEDICAID

## 2025-01-08 ENCOUNTER — ANESTHESIA EVENT (OUTPATIENT)
Dept: SURGERY | Facility: HOSPITAL | Age: 30
End: 2025-01-08
Payer: MEDICAID

## 2025-01-08 VITALS
SYSTOLIC BLOOD PRESSURE: 109 MMHG | HEIGHT: 64 IN | BODY MASS INDEX: 24.59 KG/M2 | OXYGEN SATURATION: 97 % | TEMPERATURE: 98 F | DIASTOLIC BLOOD PRESSURE: 62 MMHG | WEIGHT: 144 LBS | HEART RATE: 61 BPM | RESPIRATION RATE: 18 BRPM

## 2025-01-08 LAB
B-HCG UR QL: NEGATIVE
OHS QRS DURATION: 78 MS
OHS QTC CALCULATION: 398 MS

## 2025-01-08 PROCEDURE — 36000708 HC OR TIME LEV III 1ST 15 MIN: Performed by: STUDENT IN AN ORGANIZED HEALTH CARE EDUCATION/TRAINING PROGRAM

## 2025-01-08 PROCEDURE — 25000003 PHARM REV CODE 250: Performed by: ANESTHESIOLOGY

## 2025-01-08 PROCEDURE — 37000008 HC ANESTHESIA 1ST 15 MINUTES: Performed by: STUDENT IN AN ORGANIZED HEALTH CARE EDUCATION/TRAINING PROGRAM

## 2025-01-08 PROCEDURE — 37000009 HC ANESTHESIA EA ADD 15 MINS: Performed by: STUDENT IN AN ORGANIZED HEALTH CARE EDUCATION/TRAINING PROGRAM

## 2025-01-08 PROCEDURE — 36000709 HC OR TIME LEV III EA ADD 15 MIN: Performed by: STUDENT IN AN ORGANIZED HEALTH CARE EDUCATION/TRAINING PROGRAM

## 2025-01-08 PROCEDURE — 25000003 PHARM REV CODE 250: Performed by: NURSE ANESTHETIST, CERTIFIED REGISTERED

## 2025-01-08 PROCEDURE — G0378 HOSPITAL OBSERVATION PER HR: HCPCS

## 2025-01-08 PROCEDURE — 63600175 PHARM REV CODE 636 W HCPCS: Mod: TB | Performed by: ANESTHESIOLOGY

## 2025-01-08 PROCEDURE — 47562 LAPAROSCOPIC CHOLECYSTECTOMY: CPT | Mod: ,,, | Performed by: STUDENT IN AN ORGANIZED HEALTH CARE EDUCATION/TRAINING PROGRAM

## 2025-01-08 PROCEDURE — 25000003 PHARM REV CODE 250: Performed by: FAMILY MEDICINE

## 2025-01-08 PROCEDURE — 63600175 PHARM REV CODE 636 W HCPCS: Performed by: NURSE ANESTHETIST, CERTIFIED REGISTERED

## 2025-01-08 PROCEDURE — 96372 THER/PROPH/DIAG INJ SC/IM: CPT | Performed by: FAMILY MEDICINE

## 2025-01-08 PROCEDURE — 63600175 PHARM REV CODE 636 W HCPCS: Performed by: FAMILY MEDICINE

## 2025-01-08 PROCEDURE — 71000033 HC RECOVERY, INTIAL HOUR: Performed by: STUDENT IN AN ORGANIZED HEALTH CARE EDUCATION/TRAINING PROGRAM

## 2025-01-08 PROCEDURE — 81025 URINE PREGNANCY TEST: CPT | Performed by: STUDENT IN AN ORGANIZED HEALTH CARE EDUCATION/TRAINING PROGRAM

## 2025-01-08 PROCEDURE — 25000003 PHARM REV CODE 250: Performed by: STUDENT IN AN ORGANIZED HEALTH CARE EDUCATION/TRAINING PROGRAM

## 2025-01-08 PROCEDURE — 27201423 OPTIME MED/SURG SUP & DEVICES STERILE SUPPLY: Performed by: STUDENT IN AN ORGANIZED HEALTH CARE EDUCATION/TRAINING PROGRAM

## 2025-01-08 RX ORDER — DEXTROSE MONOHYDRATE, SODIUM CHLORIDE, AND POTASSIUM CHLORIDE 50; 1.49; 9 G/1000ML; G/1000ML; G/1000ML
INJECTION, SOLUTION INTRAVENOUS CONTINUOUS
Status: DISCONTINUED | OUTPATIENT
Start: 2025-01-08 | End: 2025-01-08 | Stop reason: HOSPADM

## 2025-01-08 RX ORDER — ACETAMINOPHEN 10 MG/ML
INJECTION, SOLUTION INTRAVENOUS
Status: DISCONTINUED | OUTPATIENT
Start: 2025-01-08 | End: 2025-01-08

## 2025-01-08 RX ORDER — METHOCARBAMOL 750 MG/1
750 TABLET, FILM COATED ORAL 4 TIMES DAILY
Qty: 40 TABLET | Refills: 0 | Status: SHIPPED | OUTPATIENT
Start: 2025-01-08 | End: 2025-01-18

## 2025-01-08 RX ORDER — IBUPROFEN 600 MG/1
600 TABLET ORAL EVERY 6 HOURS PRN
Status: DISCONTINUED | OUTPATIENT
Start: 2025-01-08 | End: 2025-01-08 | Stop reason: HOSPADM

## 2025-01-08 RX ORDER — MORPHINE SULFATE 4 MG/ML
4 INJECTION, SOLUTION INTRAMUSCULAR; INTRAVENOUS EVERY 5 MIN PRN
Status: DISCONTINUED | OUTPATIENT
Start: 2025-01-08 | End: 2025-01-08

## 2025-01-08 RX ORDER — PROPOFOL 10 MG/ML
VIAL (ML) INTRAVENOUS
Status: DISCONTINUED | OUTPATIENT
Start: 2025-01-08 | End: 2025-01-08

## 2025-01-08 RX ORDER — ONDANSETRON HYDROCHLORIDE 2 MG/ML
4 INJECTION, SOLUTION INTRAVENOUS EVERY 8 HOURS PRN
Status: DISCONTINUED | OUTPATIENT
Start: 2025-01-08 | End: 2025-01-08

## 2025-01-08 RX ORDER — ONDANSETRON HYDROCHLORIDE 2 MG/ML
4 INJECTION, SOLUTION INTRAVENOUS DAILY PRN
Status: DISCONTINUED | OUTPATIENT
Start: 2025-01-08 | End: 2025-01-08

## 2025-01-08 RX ORDER — FENTANYL CITRATE 50 UG/ML
INJECTION, SOLUTION INTRAMUSCULAR; INTRAVENOUS
Status: DISCONTINUED | OUTPATIENT
Start: 2025-01-08 | End: 2025-01-08

## 2025-01-08 RX ORDER — HYDROCODONE BITARTRATE AND ACETAMINOPHEN 7.5; 325 MG/1; MG/1
1 TABLET ORAL EVERY 6 HOURS PRN
Qty: 28 TABLET | Refills: 0 | Status: SHIPPED | OUTPATIENT
Start: 2025-01-08 | End: 2025-01-15

## 2025-01-08 RX ORDER — MIDAZOLAM HYDROCHLORIDE 1 MG/ML
INJECTION INTRAMUSCULAR; INTRAVENOUS
Status: DISCONTINUED | OUTPATIENT
Start: 2025-01-08 | End: 2025-01-08

## 2025-01-08 RX ORDER — EPHEDRINE SULFATE 50 MG/ML
INJECTION, SOLUTION INTRAVENOUS
Status: DISCONTINUED | OUTPATIENT
Start: 2025-01-08 | End: 2025-01-08

## 2025-01-08 RX ORDER — SCOLOPAMINE TRANSDERMAL SYSTEM 1 MG/1
1 PATCH, EXTENDED RELEASE TRANSDERMAL
Status: DISCONTINUED | OUTPATIENT
Start: 2025-01-08 | End: 2025-01-08 | Stop reason: HOSPADM

## 2025-01-08 RX ORDER — MORPHINE SULFATE 4 MG/ML
4 INJECTION, SOLUTION INTRAMUSCULAR; INTRAVENOUS EVERY 4 HOURS PRN
Status: DISCONTINUED | OUTPATIENT
Start: 2025-01-08 | End: 2025-01-08 | Stop reason: HOSPADM

## 2025-01-08 RX ORDER — SODIUM CHLORIDE 9 MG/ML
INJECTION, SOLUTION INTRAVENOUS CONTINUOUS
Status: DISCONTINUED | OUTPATIENT
Start: 2025-01-08 | End: 2025-01-08

## 2025-01-08 RX ORDER — TALC
6 POWDER (GRAM) TOPICAL NIGHTLY PRN
Status: DISCONTINUED | OUTPATIENT
Start: 2025-01-08 | End: 2025-01-08 | Stop reason: HOSPADM

## 2025-01-08 RX ORDER — ROCURONIUM BROMIDE 10 MG/ML
INJECTION, SOLUTION INTRAVENOUS
Status: DISCONTINUED | OUTPATIENT
Start: 2025-01-08 | End: 2025-01-08

## 2025-01-08 RX ORDER — ONDANSETRON 4 MG/1
4 TABLET, ORALLY DISINTEGRATING ORAL EVERY 6 HOURS PRN
Status: DISCONTINUED | OUTPATIENT
Start: 2025-01-08 | End: 2025-01-08 | Stop reason: HOSPADM

## 2025-01-08 RX ORDER — HYDROMORPHONE HYDROCHLORIDE 2 MG/ML
0.5 INJECTION, SOLUTION INTRAMUSCULAR; INTRAVENOUS; SUBCUTANEOUS EVERY 5 MIN PRN
Status: DISCONTINUED | OUTPATIENT
Start: 2025-01-08 | End: 2025-01-08

## 2025-01-08 RX ORDER — BUPIVACAINE HYDROCHLORIDE AND EPINEPHRINE 5; 5 MG/ML; UG/ML
INJECTION, SOLUTION EPIDURAL; INTRACAUDAL; PERINEURAL
Status: DISCONTINUED | OUTPATIENT
Start: 2025-01-08 | End: 2025-01-08 | Stop reason: HOSPADM

## 2025-01-08 RX ORDER — HYDROMORPHONE HYDROCHLORIDE 2 MG/ML
INJECTION, SOLUTION INTRAMUSCULAR; INTRAVENOUS; SUBCUTANEOUS
Status: DISCONTINUED | OUTPATIENT
Start: 2025-01-08 | End: 2025-01-08

## 2025-01-08 RX ORDER — SODIUM CHLORIDE 0.9 % (FLUSH) 0.9 %
10 SYRINGE (ML) INJECTION
Status: DISCONTINUED | OUTPATIENT
Start: 2025-01-08 | End: 2025-01-08 | Stop reason: HOSPADM

## 2025-01-08 RX ORDER — ONDANSETRON 4 MG/1
4 TABLET, ORALLY DISINTEGRATING ORAL ONCE
Status: DISCONTINUED | OUTPATIENT
Start: 2025-01-08 | End: 2025-01-08

## 2025-01-08 RX ORDER — KETOROLAC TROMETHAMINE 30 MG/ML
INJECTION, SOLUTION INTRAMUSCULAR; INTRAVENOUS
Status: DISCONTINUED | OUTPATIENT
Start: 2025-01-08 | End: 2025-01-08

## 2025-01-08 RX ORDER — DIPHENHYDRAMINE HYDROCHLORIDE 50 MG/ML
25 INJECTION INTRAMUSCULAR; INTRAVENOUS EVERY 6 HOURS PRN
Status: DISCONTINUED | OUTPATIENT
Start: 2025-01-08 | End: 2025-01-08

## 2025-01-08 RX ORDER — KETOROLAC TROMETHAMINE 30 MG/ML
15 INJECTION, SOLUTION INTRAMUSCULAR; INTRAVENOUS EVERY 6 HOURS PRN
Status: DISCONTINUED | OUTPATIENT
Start: 2025-01-08 | End: 2025-01-08 | Stop reason: HOSPADM

## 2025-01-08 RX ORDER — ACETAMINOPHEN 325 MG/1
650 TABLET ORAL EVERY 8 HOURS PRN
Status: DISCONTINUED | OUTPATIENT
Start: 2025-01-08 | End: 2025-01-08 | Stop reason: HOSPADM

## 2025-01-08 RX ORDER — GLYCOPYRROLATE 0.2 MG/ML
INJECTION, SOLUTION INTRAMUSCULAR; INTRAVENOUS
Status: DISCONTINUED | OUTPATIENT
Start: 2025-01-08 | End: 2025-01-08

## 2025-01-08 RX ORDER — NEOSTIGMINE METHYLSULFATE 5 MG/5 ML
SYRINGE (ML) INTRAVENOUS
Status: DISCONTINUED | OUTPATIENT
Start: 2025-01-08 | End: 2025-01-08

## 2025-01-08 RX ORDER — GLUCAGON 1 MG
1 KIT INJECTION
Status: DISCONTINUED | OUTPATIENT
Start: 2025-01-08 | End: 2025-01-08

## 2025-01-08 RX ORDER — ONDANSETRON 4 MG/1
4 TABLET, FILM COATED ORAL EVERY 6 HOURS PRN
Qty: 40 TABLET | Refills: 0 | Status: SHIPPED | OUTPATIENT
Start: 2025-01-08 | End: 2025-01-18

## 2025-01-08 RX ORDER — CEFAZOLIN 2 G/1
INJECTION, POWDER, FOR SOLUTION INTRAMUSCULAR; INTRAVENOUS
Status: DISCONTINUED | OUTPATIENT
Start: 2025-01-08 | End: 2025-01-08

## 2025-01-08 RX ADMIN — HYDROMORPHONE HYDROCHLORIDE 0.5 MG: 2 INJECTION, SOLUTION INTRAMUSCULAR; INTRAVENOUS; SUBCUTANEOUS at 12:01

## 2025-01-08 RX ADMIN — CEFAZOLIN 2 G: 2 INJECTION, POWDER, FOR SOLUTION INTRAMUSCULAR; INTRAVENOUS at 10:01

## 2025-01-08 RX ADMIN — SODIUM CHLORIDE: 0.9 INJECTION, SOLUTION INTRAVENOUS at 10:01

## 2025-01-08 RX ADMIN — HYDROMORPHONE HYDROCHLORIDE 0.5 MG: 2 INJECTION, SOLUTION INTRAMUSCULAR; INTRAVENOUS; SUBCUTANEOUS at 11:01

## 2025-01-08 RX ADMIN — KETOROLAC TROMETHAMINE 30 MG: 30 INJECTION, SOLUTION INTRAMUSCULAR at 11:01

## 2025-01-08 RX ADMIN — EPHEDRINE SULFATE 15 MG: 50 INJECTION, SOLUTION INTRAVENOUS at 11:01

## 2025-01-08 RX ADMIN — ONDANSETRON 4 MG: 2 INJECTION INTRAMUSCULAR; INTRAVENOUS at 12:01

## 2025-01-08 RX ADMIN — PROPOFOL 200 MG: 10 INJECTION, EMULSION INTRAVENOUS at 10:01

## 2025-01-08 RX ADMIN — MIDAZOLAM 2 MG: 1 INJECTION INTRAMUSCULAR; INTRAVENOUS at 10:01

## 2025-01-08 RX ADMIN — GLYCOPYRROLATE 0.8 MG: 0.2 INJECTION, SOLUTION INTRAMUSCULAR; INTRAVENOUS at 11:01

## 2025-01-08 RX ADMIN — MORPHINE SULFATE 4 MG: 4 INJECTION INTRAVENOUS at 12:01

## 2025-01-08 RX ADMIN — HYDROMORPHONE HYDROCHLORIDE 0.5 MG: 2 INJECTION, SOLUTION INTRAMUSCULAR; INTRAVENOUS; SUBCUTANEOUS at 10:01

## 2025-01-08 RX ADMIN — KETOROLAC TROMETHAMINE 15 MG: 30 INJECTION, SOLUTION INTRAMUSCULAR at 08:01

## 2025-01-08 RX ADMIN — ACETAMINOPHEN 1000 MG: 10 INJECTION, SOLUTION INTRAVENOUS at 10:01

## 2025-01-08 RX ADMIN — ONDANSETRON 4 MG: 4 TABLET, ORALLY DISINTEGRATING ORAL at 04:01

## 2025-01-08 RX ADMIN — POTASSIUM CHLORIDE, DEXTROSE MONOHYDRATE AND SODIUM CHLORIDE: 150; 5; 900 INJECTION, SOLUTION INTRAVENOUS at 08:01

## 2025-01-08 RX ADMIN — ROCURONIUM BROMIDE 30 MG: 10 INJECTION, SOLUTION INTRAVENOUS at 10:01

## 2025-01-08 RX ADMIN — Medication 5 MG: at 11:01

## 2025-01-08 RX ADMIN — POTASSIUM CHLORIDE, DEXTROSE MONOHYDRATE AND SODIUM CHLORIDE: 150; 5; 900 INJECTION, SOLUTION INTRAVENOUS at 12:01

## 2025-01-08 RX ADMIN — FENTANYL CITRATE 100 MCG: 50 INJECTION INTRAMUSCULAR; INTRAVENOUS at 10:01

## 2025-01-08 RX ADMIN — SCOPOLAMINE 1 PATCH: 1.5 PATCH, EXTENDED RELEASE TRANSDERMAL at 08:01

## 2025-01-08 NOTE — ANESTHESIA PROCEDURE NOTES
Intubation    Date/Time: 1/8/2025 10:43 AM    Performed by: Behzad Walker CRNA  Authorized by: Behzad Walker CRNA    Intubation:     Induction:  Intravenous    Intubated:  Postinduction    Mask Ventilation:  Easy mask    Attempts:  1    Attempted By:  CRNA    Method of Intubation:  Direct    Blade:  Caicedo 2    Laryngeal View Grade: Grade I - full view of cords      Difficult Airway Encountered?: No      Complications:  None    Airway Device:  Oral endotracheal tube    Airway Device Size:  7.0    Style/Cuff Inflation:  Cuffed    Inflation Amount (mL):  6    Tube secured:  21    Secured at:  The lips    Placement Verified By:  Capnometry    Complicating Factors:  None    Findings Post-Intubation:  BS equal bilateral and atraumatic/condition of teeth unchanged

## 2025-01-08 NOTE — ED PROVIDER NOTES
Encounter Date: 2025       History     Chief Complaint   Patient presents with    Abdominal Pain     Sent per Dr. Archibald d/t hx of gallbladder attacks.  C/o epigastric pain that radiates through to her back and causes nausea and dizziness.         Abdominal Pain  The current episode started several days ago. The problem has not changed since onset.The abdominal pain is located in the epigastric region. The abdominal pain does not radiate. The severity of the abdominal pain is 0/10. The abdominal pain is relieved by nothing. The abdominal pain is exacerbated by vomiting. The other symptoms of the illness include nausea and vomiting. The other symptoms of the illness do not include fever or jaundice.     Review of patient's allergies indicates:   Allergen Reactions    [place] Hives, Itching and Rash     Past Medical History:   Diagnosis Date    Acid reflux     Bradycardia     Migraines     Scoliosis      Past Surgical History:   Procedure Laterality Date    TUBAL LIGATION       Family History   Problem Relation Name Age of Onset    Heart disease Paternal Grandfather      Heart disease Paternal Grandmother      Thyroid cancer Paternal Grandmother      Heart disease Father      No Known Problems Mother      No Known Problems Brother          3 brothers     Social History     Tobacco Use    Smoking status: Former     Types: Vaping with nicotine     Quit date: 2024     Years since quittin.0    Smokeless tobacco: Never   Substance Use Topics    Alcohol use: Not Currently     Comment: occ    Drug use: Never     Review of Systems   Constitutional:  Negative for fever.   Gastrointestinal:  Positive for abdominal pain, nausea and vomiting. Negative for jaundice.   All other systems reviewed and are negative.      Physical Exam     Initial Vitals [25 2304]   BP Pulse Resp Temp SpO2   136/77 71 16 97.8 °F (36.6 °C) 100 %      MAP       --         Physical Exam    Nursing note and vitals  reviewed.  Constitutional: Vital signs are normal. She appears well-developed and well-nourished. She is not diaphoretic.  Non-toxic appearance. She does not have a sickly appearance.   HENT:   Head: Normocephalic and atraumatic.   Right Ear: Hearing, tympanic membrane, external ear and ear canal normal.   Left Ear: Tympanic membrane, external ear and ear canal normal. Mouth/Throat: Uvula is midline, oropharynx is clear and moist and mucous membranes are normal.   Eyes: Conjunctivae, EOM and lids are normal. Pupils are equal, round, and reactive to light. Lids are everted and swept, no foreign bodies found.   Neck: Trachea normal and phonation normal. Neck supple.   Normal range of motion.   Full passive range of motion without pain.     Cardiovascular:  Normal rate, regular rhythm, normal heart sounds, intact distal pulses and normal pulses.           Pulmonary/Chest: No respiratory distress. She has no wheezes. She has no rales. She exhibits no tenderness.   Abdominal: Abdomen is soft. Bowel sounds are normal. She exhibits no distension and no mass. There is no abdominal tenderness. There is no rebound and no guarding.   Musculoskeletal:      Cervical back: Normal, full passive range of motion without pain, normal range of motion and neck supple.      Thoracic back: Normal.      Lumbar back: Normal.     Neurological: She is alert and oriented to person, place, and time. She has normal strength. No cranial nerve deficit or sensory deficit.   Skin: Skin is intact.   Psychiatric: She has a normal mood and affect. Her speech is normal and behavior is normal.         ED Course   Critical Care    Date/Time: 1/7/2025 11:20 PM    Performed by: Jb Arzola Jr., MD  Authorized by: Joselin Archibald MD  Direct patient critical care time: 15 minutes  Additional history critical care time: 5 minutes  Ordering / reviewing critical care time: 5 minutes  Documentation critical care time: 5 minutes  Consulting other  physicians critical care time: 5 minutes  Total critical care time (exclusive of procedural time) : 35 minutes  Critical care was necessary to treat or prevent imminent or life-threatening deterioration of the following conditions: cardiac failure, dehydration, trauma, sepsis, metabolic crisis, shock, renal failure, endocrine crisis, circulatory failure, toxidrome, hepatic failure, respiratory failure and CNS failure or compromise.  Critical care was time spent personally by me on the following activities: evaluation of patient's response to treatment, examination of patient, obtaining history from patient or surrogate, ordering and performing treatments and interventions, re-evaluation of patient's condition and review of old charts.        Labs Reviewed   CBC WITHOUT DIFFERENTIAL   COMPREHENSIVE METABOLIC PANEL     EKG Readings: (Independently Interpreted)   Heart Rate: 70. Ectopy: No Ectopy. Conduction: Normal. ST Segments: Normal ST Segments. T Waves: Normal. Clinical Impression: Normal Sinus Rhythm       Imaging Results    None          Medications - No data to display  Medical Decision Making                        Medical Decision Making:   Clinical Tests:   Lab Tests: Ordered and Reviewed  ED Management:  Discussed with Dr. Cordon who who accepted admission.  She reports NPO for surgery in the morning             Clinical Impression:  Final diagnoses:  [R11.2] Intractable nausea and vomiting (Primary)          ED Disposition Condition    Observation                 Jb Arzola Jr., MD  01/07/25 [id number]       Jb Arzola Jr., MD  01/08/25 0008

## 2025-01-08 NOTE — INTERVAL H&P NOTE
Patient seen and examined.  No interval change since the below obtained H&P  Informed consent verified and surgical site marked  NPO since midnight  All questions and concerns addressed  To OR for lap vs open val     Joselin Archibald MD  General Surgery   [phone]

## 2025-01-08 NOTE — H&P
[facility] - Emergency Department  General Surgery  History & Physical    Patient Name: Sierra Singer  MRN: [id number]  Admission Date: 1/7/2025  Attending Physician: Joselin Archibald MD   Primary Care Provider: Caio Eastman FNP    Patient information was obtained from patient, past medical records, and ER records.     Subjective:     Chief Complaint:   Chief Complaint   Patient presents with    Abdominal Pain     Sent per Dr. Archibald d/t hx of gallbladder attacks.  C/o epigastric pain that radiates through to her back and causes nausea and dizziness.          History of Present Illness: 30yo female with history of gallbladder polyp and biliary colic who presents with worsening nausea and vomiting.  Scheduled for lap val on 1/22, but presents with intractable nausea and vomiting since early this afternoon.  Contacted  clinic and was directed to the ED for evaluation.  Has not tolerated PO intake since this AM.   consulted for evaluation.     No current facility-administered medications on file prior to encounter.     Current Outpatient Medications on File Prior to Encounter   Medication Sig    diclofenac (VOLTAREN) 75 MG EC tablet Take 75 mg by mouth 2 (two) times daily.    meloxicam (MOBIC) 15 MG tablet Take 15 mg by mouth daily as needed.    metoprolol tartrate (LOPRESSOR) 50 MG tablet Take 50 mg by mouth 2 (two) times daily.    pantoprazole (PROTONIX) 20 MG tablet Take 20 mg by mouth once daily.    topiramate 25 mg capsule Take 25 mg by mouth 2 (two) times daily.    methocarbamoL (ROBAXIN) 500 MG Tab Take 500 mg by mouth every 8 (eight) hours as needed.       Review of patient's allergies indicates:   Allergen Reactions    [place]ar[place] Hives, Itching and Rash       Past Medical History:   Diagnosis Date    Acid reflux     Bradycardia     Migraines     Scoliosis      Past Surgical History:   Procedure Laterality Date    TUBAL LIGATION       Family History       Problem Relation (Age of Onset)    Heart disease  Paternal Grandfather, Paternal Grandmother, Father    No Known Problems Mother, Brother    Thyroid cancer Paternal Grandmother          Tobacco Use    Smoking status: Former     Types: Vaping with nicotine     Quit date: 2024     Years since quittin.0    Smokeless tobacco: Never   Substance and Sexual Activity    Alcohol use: Not Currently     Comment: occ    Drug use: Never    Sexual activity: Yes     Partners: Male     Birth control/protection: See Surgical Hx     Review of Systems   Constitutional:  Negative for activity change, appetite change, fatigue and fever.   Respiratory:  Negative for apnea, cough, chest tightness and shortness of breath.    Cardiovascular:  Negative for chest pain.   Gastrointestinal:  Positive for abdominal pain, nausea and vomiting. Negative for abdominal distention, anal bleeding, blood in stool, constipation, diarrhea and rectal pain.   All other systems reviewed and are negative.    Objective:     Vital Signs (Most Recent):  Temp: 97.8 °F (36.6 °C) (25 2304)  Pulse: 71 (25 2304)  Resp: 16 (25 2304)  BP: 136/77 (25 2304)  SpO2: 100 % (25 2304) Vital Signs (24h Range):  Temp:  [97.8 °F (36.6 °C)] 97.8 °F (36.6 °C)  Pulse:  [71] 71  Resp:  [16] 16  SpO2:  [100 %] 100 %  BP: (136)/(77) 136/77     Weight: 65.3 kg (144 lb)  Body mass index is 24.72 kg/m².     Physical Exam  Constitutional:       General: She is not in acute distress.     Appearance: She is not ill-appearing or toxic-appearing.   Cardiovascular:      Rate and Rhythm: Normal rate and regular rhythm.   Pulmonary:      Effort: Pulmonary effort is normal. No respiratory distress.   Abdominal:      General: There is no distension.      Palpations: Abdomen is soft.      Tenderness: There is abdominal tenderness (RUQ). There is no guarding.      Hernia: No hernia is present.   Skin:     Capillary Refill: Capillary refill takes less than 2 seconds.   Neurological:      Mental Status: She is  alert. Mental status is at baseline.            Labs:   Pending    Significant Diagnostics:  I have reviewed all pertinent imaging results/findings within the past 24 hours.    Assessment/Plan:     Intractable nausea and vomiting  Secondary to known biliary dysfunction  Follow up CBC, CMP   To OR 1/8 for lap vs open cholecystectomy   EKG   NPO   IVFs           VTE Risk Mitigation (From admission, onward)      None            Joselin Archibald MD  General Surgery  [place] - Emergency Department

## 2025-01-08 NOTE — SUBJECTIVE & OBJECTIVE
No current facility-administered medications on file prior to encounter.     Current Outpatient Medications on File Prior to Encounter   Medication Sig    diclofenac (VOLTAREN) 75 MG EC tablet Take 75 mg by mouth 2 (two) times daily.    meloxicam (MOBIC) 15 MG tablet Take 15 mg by mouth daily as needed.    metoprolol tartrate (LOPRESSOR) 50 MG tablet Take 50 mg by mouth 2 (two) times daily.    pantoprazole (PROTONIX) 20 MG tablet Take 20 mg by mouth once daily.    topiramate 25 mg capsule Take 25 mg by mouth 2 (two) times daily.    methocarbamoL (ROBAXIN) 500 MG Tab Take 500 mg by mouth every 8 (eight) hours as needed.       Review of patient's allergies indicates:   Allergen Reactions    [place] Hives, Itching and Rash       Past Medical History:   Diagnosis Date    Acid reflux     Bradycardia     Migraines     Scoliosis      Past Surgical History:   Procedure Laterality Date    TUBAL LIGATION       Family History       Problem Relation (Age of Onset)    Heart disease Paternal Grandfather, Paternal Grandmother, Father    No Known Problems Mother, Brother    Thyroid cancer Paternal Grandmother          Tobacco Use    Smoking status: Former     Types: Vaping with nicotine     Quit date: 2024     Years since quittin.0    Smokeless tobacco: Never   Substance and Sexual Activity    Alcohol use: Not Currently     Comment: occ    Drug use: Never    Sexual activity: Yes     Partners: Male     Birth control/protection: See Surgical Hx     Review of Systems   Constitutional:  Negative for activity change, appetite change, fatigue and fever.   Respiratory:  Negative for apnea, cough, chest tightness and shortness of breath.    Cardiovascular:  Negative for chest pain.   Gastrointestinal:  Positive for abdominal pain, nausea and vomiting. Negative for abdominal distention, anal bleeding, blood in stool, constipation, diarrhea and rectal pain.   All other systems reviewed and are negative.    Objective:     Vital Signs  (Most Recent):  Temp: 97.8 °F (36.6 °C) (01/07/25 2304)  Pulse: 71 (01/07/25 2304)  Resp: 16 (01/07/25 2304)  BP: 136/77 (01/07/25 2304)  SpO2: 100 % (01/07/25 2304) Vital Signs (24h Range):  Temp:  [97.8 °F (36.6 °C)] 97.8 °F (36.6 °C)  Pulse:  [71] 71  Resp:  [16] 16  SpO2:  [100 %] 100 %  BP: (136)/(77) 136/77     Weight: 65.3 kg (144 lb)  Body mass index is 24.72 kg/m².     Physical Exam  Constitutional:       General: She is not in acute distress.     Appearance: She is not ill-appearing or toxic-appearing.   Cardiovascular:      Rate and Rhythm: Normal rate and regular rhythm.   Pulmonary:      Effort: Pulmonary effort is normal. No respiratory distress.   Abdominal:      General: There is no distension.      Palpations: Abdomen is soft.      Tenderness: There is abdominal tenderness (RUQ). There is no guarding.      Hernia: No hernia is present.   Skin:     Capillary Refill: Capillary refill takes less than 2 seconds.   Neurological:      Mental Status: She is alert. Mental status is at baseline.            Labs:   Pending    Significant Diagnostics:  I have reviewed all pertinent imaging results/findings within the past 24 hours.

## 2025-01-08 NOTE — DISCHARGE INSTRUCTIONS
Our goal at [facility] is to always give you outstanding care and exceptional service. You may receive a survey by mail, text or e-mail in the next 7-10 days from Turner Gonsales and our leadership team asking about the care you received with us. The survey should only take 5-10 minutes to complete and is very important to us.     Your feedback provides us with a way to recognize our staff who work tirelessly to provide the best care! Also, your responses help us learn how to improve when your experience was below our aspiration of excellence. We WILL use your feedback to continue making improvements to help us provide the highest quality care. We keep your personal information and feedback confidential. We appreciate your time completing this survey and can't wait to hear from you!!!     We want you to leave us today feeling like you can DEFINITELY recommend us to others! We look forward to your continued care with us! Thanks so much for choosing [facility] for your healthcare needs!

## 2025-01-08 NOTE — NURSING
Arrived to [facility] approximately 0015 via  joey Hall RN. Pt is AAO x 4, ambulated to bed with no deficits noted. Pt denies pain/nausea at this time, educated pt on pain management, NPO status, CHG wipes given and pt educated on use, CB explained and placed in reach, bed in lowest position, lights adjusted, care plan ongoing.

## 2025-01-08 NOTE — OP NOTE
[facility] - OR Periop Services  General Surgery Department  Operative Note    SUMMARY     Date of Procedure: 1/7/2025 - 1/8/2025    Procedure: Procedure(s) (LRB):  CHOLECYSTECTOMY, LAPAROSCOPIC: 50211 (CPT®)     Surgeon(s) and Role:  Joselin Archibald MD     Pre-Operative Diagnosis: Pre-Op Diagnosis Codes:      * Intractable nausea and vomiting [R11.2]     * Biliary colic [K80.50]     * Gallbladder polyp [K82.4]    Post-Operative Diagnosis: Same        Anesthesia: General    Findings:  Distended gallbladder without omental adhesions  Critical view of safety achieved  Cystic duct and artery clipped x2 and ligated in standard fashion  gallbladder removed intact  Umbilical incision repaired with 0 Vicryl sutures    Indications for the Procedure:  30yo female who presents with biliary colic with intractable nausea, vomiting, and abdominal pain and gallbladder polyp as demonstrated by pre-op Ultrasound and physical exam.  Risks and benefits of surgical intervention versus medical management explained to patient.  Risks of lap cholecystectomy including but not limited to bleeding infection, bile leak, damage to common bile duct and post cholecystectomy syndrome explained to patient.  Patient voiced understanding and elected to undergo lap vs open cholecystectomy.     Operative Conduct in Detail:   After informed consent was obtained, the patient was rolled into the OR and placed in the supine position where endotracheal intubation was initiated.  A time out was performed in the OR all were in agreement.  Pre-operative antibiotics were hung.      The patient's abdomen was prepped and draped in standard fashion.  An infraumbilical incision was made, and the abdominal cavity was accessed via the Alford technique.  0-Vicryl sutures were placed at the apex of the facial defect.   Pneumoperitoneum was then created with CO2 and patient tolerated well without any adverse changes in vital signs or peak airway pressures.  Three additional trocars were introduced under direct vision in the subxyphoid, right midclavicular subcostal, and RLQ subcostal positions.      The gallbladder was identified, the fundus grasped and retracted cephalad. Adhesions were lysed bluntly and with the electrocautery where indicated, taking care not to injure any adjacent organs or viscus. The infundibulum was grasped and retracted laterally, exposing the peritoneum overlying the triangle of Calot. This was then divided and exposed in a blunt fashion. The cystic duct was clearly identified and bluntly dissected circumferentially. The junctions of the gallbladder, cystic duct and common bile duct were clearly identified prior to the division of any linear structure.     The cystic duct was then clipped three times with two on the stay side and divided sharply. The cystic artery was identified and ligated/divided with clips in a similar fashion. The gallbladder was dissected from the liver bed using hook electrocautery. The gallbladder was removed intact. The liver bed was inspected and there was no active bleeding or oozing of bile.    Pneumoperitoneum was completely reduced after viewing removal of all trocars under direct vision. The wound was thoroughly irrigated and the fascia was then closed with interrupted Vicryl suture; the skin was then closed with subcuticular Monocryl.   Sterile dressing was applied.  The patient was awakened from anesthesia and extubated in the OR without issue.  All lap and instrument counts were correct x2 prior to and after skin closure.     Complications: No    Estimated Blood Loss (EBL): Minimal           Implants: * No implants in log *    Specimens:   Specimens (From admission, onward)       Start     Ordered    01/08/25 1114  Specimen to Pathology, Surgery General Surgery  Once        Comments: Pre-op Diagnosis: Intractable nausea and vomiting [R11.2]Biliary colic [K80.50]Gallbladder polyp  [K82.4]Procedure(s):CHOLECYSTECTOMY, LAPAROSCOPIC Number of specimens: 1Name of specimens: 1) GALLBLADDER @  1113     References:    Click here for ordering Quick Tip   Question Answer Comment   Procedure Type: General Surgery    Specimen Class: Complex case/Special    Release to patient Immediate        01/08/25 1118                             Condition: Good    Disposition: PACU - hemodynamically stable.      Joselin Archibald MD  General Surgery   [phone]

## 2025-01-08 NOTE — HPI
30yo female with history of gallbladder polyp and biliary colic who presents with worsening nausea and vomiting.  Scheduled for lap val on 1/22, but presents with intractable nausea and vomiting since early this afternoon.  Contacted GS clinic and was directed to the ED for evaluation.  Has not tolerated PO intake since this AM.  GS consulted for evaluation.

## 2025-01-08 NOTE — DISCHARGE SUMMARY
[facility]  General Surgery  Discharge Summary      Patient Name: Sierra Singer  MRN: [id number]  Admission Date: 1/7/2025  Hospital Length of Stay: 0 days  Discharge Date and Time:  01/08/2025 11:59 AM  Attending Physician: Joselin Archibald MD   Discharging Provider: Joselin Archibald MD  Primary Care Provider: Caio Eastman FNP    HPI:   28yo female with history of gallbladder polyp and biliary colic who presents with worsening nausea and vomiting.  Scheduled for lap val on 1/22, but presents with intractable nausea and vomiting since early this afternoon.  Contacted  clinic and was directed to the ED for evaluation.  Has not tolerated PO intake since this AM.  GS consulted for evaluation.     Procedure(s) (LRB):  CHOLECYSTECTOMY, LAPAROSCOPIC (N/A)      Indwelling Lines/Drains at time of discharge:   Lines/Drains/Airways       None                 Hospital Course: And underwent uncomplicated laparoscopic cholecystectomy on 01/08.  Patient tolerated procedure well and taken back to [facility] floor for recovery.  Patient discharged later that day in stable condition with plans for follow up in general surgery clinic in 2 weeks.    Goals of Care Treatment Preferences:  Code Status: Full Code      Consults:     Significant Diagnostic Studies: see above    Pending Diagnostic Studies:       Procedure Component Value Units Date/Time    Specimen to Pathology, Surgery General Surgery [[id number]] Collected: 01/08/25 1113    Order Status: Sent Lab Status: In process Updated: 01/08/25 1155    Specimen: Tissue           Final Active Diagnoses:    Diagnosis Date Noted POA    PRINCIPAL PROBLEM:  Intractable abdominal pain [R10.9] 01/07/2025 Yes    Biliary colic [K80.50] 01/08/2025 Yes    Gallbladder polyp [K82.4] 12/31/2024 Yes      Problems Resolved During this Admission:      Discharged Condition: good    Disposition: Home or Self Care    Follow Up:   Follow-up Information       Joselin Archibald MD Follow  up in 2 week(s).    Specialty: General Surgery  Contact information:  [address] [facility]  [address] [place] [address]  [phone]                           Patient Instructions:      Diet Adult Regular   Order Comments: Low fat     Lifting restrictions   Order Comments: No lifting, pushing, or pulling greater than 10lbs for 6 weeks to reduce risk of hernia   You may return to work in one week if you are able to adhere to the lifting restriction above  If you are unable to perform your regular duties with the restrictions, please contact Dr. Archibald's office     No driving until:   Order Comments: No driving while on narcotic pain medication  No driving for two weeks until follow up with Dr. Archibald     No dressing needed   Order Comments: Keep dry for 24 hours   After 24 hours, you may shower daily and allow soapy water to run over incisions  Skin glue will fall off on its own with time  Do not scrub or submerge in water for two weeks     Notify your health care provider if you experience any of the following:  temperature >100.4     Notify your health care provider if you experience any of the following:  persistent nausea and vomiting or diarrhea     Notify your health care provider if you experience any of the following:  severe uncontrolled pain     Notify your health care provider if you experience any of the following:  redness, tenderness, or signs of infection (pain, swelling, redness, odor or green/yellow discharge around incision site)     Activity as tolerated     Medications:  Reconciled Home Medications:      Medication List        START taking these medications      HYDROcodone-acetaminophen 7.5-325 mg per tablet  Commonly known as: NORCO  Take 1 tablet by mouth every 6 (six) hours as needed for Pain.     ondansetron 4 MG tablet  Commonly known as: ZOFRAN  Take 1 tablet (4 mg total) by mouth every 6 (six) hours as needed for Nausea.            CHANGE how you take these medications      *  methocarbamoL 500 MG Tab  Commonly known as: ROBAXIN  Take 500 mg by mouth every 8 (eight) hours as needed.  What changed: Another medication with the same name was added. Make sure you understand how and when to take each.     * methocarbamoL 750 MG Tab  Commonly known as: ROBAXIN  Take 1 tablet (750 mg total) by mouth 4 (four) times daily. for 10 days  What changed: You were already taking a medication with the same name, and this prescription was added. Make sure you understand how and when to take each.           * This list has 2 medication(s) that are the same as other medications prescribed for you. Read the directions carefully, and ask your doctor or other care provider to review them with you.                CONTINUE taking these medications      diclofenac 75 MG EC tablet  Commonly known as: VOLTAREN  Take 75 mg by mouth 2 (two) times daily.     meloxicam 15 MG tablet  Commonly known as: MOBIC  Take 15 mg by mouth daily as needed.     metoprolol tartrate 50 MG tablet  Commonly known as: LOPRESSOR  Take 50 mg by mouth 2 (two) times daily.     pantoprazole 20 MG tablet  Commonly known as: PROTONIX  Take 20 mg by mouth once daily.     topiramate 25 mg capsule  Take 25 mg by mouth 2 (two) times daily.            Time spent on the discharge of patient: 8 minutes    Joselin Archibald MD  General Surgery  [facility]

## 2025-01-08 NOTE — PLAN OF CARE
[facility] Surg  Initial Discharge Assessment       Primary Care Provider: Caio Eastman FNP    Admission Diagnosis: Biliary colic [K80.50]  Gallbladder polyp [K82.4]  Pre-op exam [Z01.818]  Intractable nausea and vomiting [R11.2]  Intractable abdominal pain [R10.9]    Admission Date: 1/7/2025  Expected Discharge Date: 1/8/2025    Transition of Care Barriers: None    Payor: MEDICAID / Plan: [employer] / Product Type: Managed Medicaid /     Extended Emergency Contact Information  Primary Emergency Contact: Harriett Carson  Mobile Phone: [phone]  Relation: Mother  Preferred language: English   needed? No  Secondary Emergency Contact: Jeronimo Lynne  Mobile Phone: [phone]  Relation: Significant other    Discharge Plan A: Home, Home with family  Discharge Plan B: Home, Home with family      [facility] - [place], [place] - [address]  [address]  [address]  [place] [address]  Phone: [phone] Fax: [phone]      Initial Assessment (most recent)       Adult Discharge Assessment - 01/08/25 0932          Discharge Assessment    Assessment Type Discharge Planning Assessment     Confirmed/corrected address, phone number and insurance Yes     Confirmed Demographics Correct on Facesheet     Source of Information patient     When was your last doctors appointment? 12/31/24     Reason For Admission Intractable nausea and vomiting     People in Home child(froy), dependent;significant other     Do you expect to return to your current living situation? Yes     Prior to hospitilization cognitive status: Alert/Oriented     Current cognitive status: Alert/Oriented     Walking or Climbing Stairs Difficulty no   The patient reports that she is independent    Dressing/Bathing Difficulty no     Home Layout Able to live on 1st floor     Equipment Currently Used at Home none     Readmission within 30 days? No     Patient currently being followed by outpatient case management?  Unable to determine (comments)     Do you currently have service(s) that help you manage your care at home? No     Do you take prescription medications? Yes     Do you have prescription coverage? Yes     Coverage MEDICAID - [employer]     Do you have any problems affording any of your prescribed medications? TBD     Is the patient taking medications as prescribed? yes     Who is going to help you get home at discharge? family     How do you get to doctors appointments? car, drives self     Are you on dialysis? No     Do you take coumadin? No     Discharge Plan A Home;Home with family     Discharge Plan B Home;Home with family     DME Needed Upon Discharge  other (see comments)   TBD    Discharge Plan discussed with: Patient;Spouse/sig other     Name(s) and Number(s) Jeronimo (significant other) [phone]     Transition of Care Barriers None        Physical Activity    On average, how many days per week do you engage in moderate to strenuous exercise (like a brisk walk)? 1 day   Patient reports that she goes to outpatient physical therapy    On average, how many minutes do you engage in exercise at this level? 60 min        Financial Resource Strain    How hard is it for you to pay for the very basics like food, housing, medical care, and heating? Not hard at all        Housing Stability    In the last 12 months, was there a time when you were not able to pay the mortgage or rent on time? Patient unable to answer     At any time in the past 12 months, were you homeless or living in a shelter (including now)? Patient unable to answer        Transportation Needs    Has the lack of transportation kept you from medical appointments, meetings, work or from getting things needed for daily living? No        Food Insecurity    Within the past 12 months, you worried that your food would run out before you got the money to buy more. Never true     Within the past 12 months, the food you bought just didn't last and  you didn't have money to get more. Never true        Stress    Do you feel stress - tense, restless, nervous, or anxious, or unable to sleep at night because your mind is troubled all the time - these days? Very much        Social Isolation    How often do you feel lonely or isolated from those around you?  Rarely        Utilities    In the past 12 months has the electric, gas, oil, or water company threatened to shut off services in your home? No        Health Literacy    How often do you need to have someone help you when you read instructions, pamphlets, or other written material from your doctor or pharmacy? Sometimes                 Discharge assessment completed with patient.  The patient significant other was present at her bedside. She gave me permission to complete the assessment with him present. The patient reports that she live with her significant other and children. Jeronimo expressed that he plans to assist the patient until he has to return back to work. Patient currently does not use any DME for her care.     The patient does present with a few social determinants of health. She declined resources at this time. Discharge planning checklist given to patient/family with instructions to contact  for any needs.  SW will follow as needed.

## 2025-01-08 NOTE — ASSESSMENT & PLAN NOTE
Secondary to known biliary dysfunction  Follow up CBC, CMP   To OR 1/8 for lap vs open cholecystectomy   EKG   NPO   IVFs

## 2025-01-08 NOTE — ANESTHESIA PREPROCEDURE EVALUATION
01/08/2025  Sierra Singer is a 29 y.o., female.      Pre-op Assessment    I have reviewed the Patient Summary Reports.    I have reviewed the NPO Status.   I have reviewed the Medications.     Review of Systems  Anesthesia Hx:  No problems with previous Anesthesia             Denies Family Hx of Anesthesia complications.    Denies Personal Hx of Anesthesia complications.                    Social:  Former Smoker       Cardiovascular:         Dysrhythmias          ECG has been reviewed. PATRICIA                           Pulmonary:  Pulmonary Normal                       Renal/:  Renal/ Normal                 Hepatic/GI:     GERD, poorly controlled                Neurological:      Headaches                                 Endocrine:  Endocrine Normal            Psych:   anxiety               Lab Results   Component Value Date    WBC 8.68 01/07/2025    HGB 13.6 01/07/2025    HCT 39.2 01/07/2025    MCV 89 01/07/2025     01/07/2025      CMP  Sodium   Date Value Ref Range Status   01/07/2025 140 136 - 145 mmol/L Final     Potassium   Date Value Ref Range Status   01/07/2025 3.4 (L) 3.5 - 5.1 mmol/L Final     Chloride   Date Value Ref Range Status   01/07/2025 108 95 - 110 mmol/L Final     CO2   Date Value Ref Range Status   01/07/2025 23 23 - 29 mmol/L Final     Glucose   Date Value Ref Range Status   01/07/2025 84 70 - 110 mg/dL Final     BUN   Date Value Ref Range Status   01/07/2025 18 6 - 20 mg/dL Final     Creatinine   Date Value Ref Range Status   01/07/2025 0.9 0.5 - 1.4 mg/dL Final     Calcium   Date Value Ref Range Status   01/07/2025 9.0 8.7 - 10.5 mg/dL Final     Total Protein   Date Value Ref Range Status   01/07/2025 7.5 6.0 - 8.4 g/dL Final     Albumin   Date Value Ref Range Status   01/07/2025 4.5 3.5 - 5.2 g/dL Final     Total Bilirubin   Date Value Ref Range Status   01/07/2025 0.7 0.1 -  1.0 mg/dL Final     Comment:     For infants and newborns, interpretation of results should be based  on gestational age, weight and in agreement with clinical  observations.    Premature Infant recommended reference ranges:  Up to 24 hours.............<8.0 mg/dL  Up to 48 hours............<12.0 mg/dL  3-5 days..................<15.0 mg/dL  6-29 days.................<15.0 mg/dL       Alkaline Phosphatase   Date Value Ref Range Status   01/07/2025 72 55 - 135 U/L Final     AST   Date Value Ref Range Status   01/07/2025 22 10 - 40 U/L Final     ALT   Date Value Ref Range Status   01/07/2025 12 10 - 44 U/L Final     Anion Gap   Date Value Ref Range Status   01/07/2025 9 8 - 16 mmol/L Final     eGFR   Date Value Ref Range Status   01/07/2025 >60.0 >60 mL/min/1.73 m^2 Final        Physical Exam  General: Well nourished    Airway:  Mallampati: II / I  Mouth Opening: Normal  TM Distance: Normal  Tongue: Normal  Neck ROM: Normal ROM    Dental:  Intact    Chest/Lungs:  Clear to auscultation    Heart:  Rate: Normal  Rhythm: Regular Rhythm  Sounds: Normal        Anesthesia Plan  Type of Anesthesia, risks & benefits discussed:    Anesthesia Type: Gen ETT  Intra-op Monitoring Plan: Standard ASA Monitors  Post Op Pain Control Plan: multimodal analgesia  Induction:  IV  Airway Plan: Direct  Informed Consent: Informed consent signed with the Patient and all parties understand the risks and agree with anesthesia plan.  All questions answered.   ASA Score: 2  Day of Surgery Review of History & Physical: I have interviewed and examined the patient. I have reviewed the patient's H&P dated: There are no significant changes.     Ready For Surgery From Anesthesia Perspective.     .

## 2025-01-08 NOTE — HOSPITAL COURSE
And underwent uncomplicated laparoscopic cholecystectomy on 01/08.  Patient tolerated procedure well and taken back to [facility] floor for recovery.  Patient discharged later that day in stable condition with plans for follow up in general surgery clinic in 2 weeks.

## 2025-01-08 NOTE — PLAN OF CARE
AVS reviewed w/ pt and purposeful rounding ongoing. IV discontinued per discharged orders. Pt being discharged home via significant other.

## 2025-01-08 NOTE — PLAN OF CARE
POC reviewed with pt, Purposeful rounding ongoing, VSS, IVFs infusing per MD orders without complications, IV site clean/Dry/Intact, Pt denies pain/needs at this time, CB in reach, bed in lowest position, lighting adjusted to pt's preference, care plan ongoing.        Problem: Adult Inpatient Plan of Care  Goal: Plan of Care Review  Outcome: Progressing  Goal: Patient-Specific Goal (Individualized)  Outcome: Progressing  Goal: Absence of Hospital-Acquired Illness or Injury  Outcome: Progressing  Goal: Optimal Comfort and Wellbeing  Outcome: Progressing  Goal: Readiness for Transition of Care  Outcome: Progressing

## 2025-01-08 NOTE — TRANSFER OF CARE
Anesthesia Transfer of Care Note    Patient: Sierra Singer    Procedure(s) Performed: Procedure(s) (LRB):  CHOLECYSTECTOMY, LAPAROSCOPIC (N/A)    Patient location: PACU    Anesthesia Type: general    Transport from OR: Transported from OR on room air with adequate spontaneous ventilation    Post pain: adequate analgesia    Post assessment: no apparent anesthetic complications    Post vital signs: stable    Level of consciousness: awake    Nausea/Vomiting: no nausea/vomiting    Complications: none    Transfer of care protocol was followed      Last vitals:   156/86  16 RR  100% O2 SAT  37 C TEMP  107 HR

## 2025-01-09 NOTE — ANESTHESIA POSTPROCEDURE EVALUATION
Anesthesia Post Evaluation    Patient: Sierra Singer    Procedure(s) Performed: Procedure(s) (LRB):  CHOLECYSTECTOMY, LAPAROSCOPIC (N/A)    Final Anesthesia Type: general      Patient location during evaluation: med/surg floor  Patient participation: Yes- Able to Participate  Level of consciousness: awake  Post-procedure vital signs: reviewed and stable  Pain management: adequate  Airway patency: patent    PONV status at discharge: No PONV  Anesthetic complications: no      Cardiovascular status: blood pressure returned to baseline  Respiratory status: spontaneous ventilation  Hydration status: euvolemic  Follow-up not needed.              Vitals Value Taken Time   /62 01/08/25 1610   Temp 36.6 °C (97.9 °F) 01/08/25 1610   Pulse 61 01/08/25 1610   Resp 18 01/08/25 1610   SpO2 97 % 01/08/25 1610         Event Time   Out of Recovery 01/08/2025 12:21:46         Pain/Lexi Score: Pain Rating Prior to Med Admin: 8 (1/8/2025 12:50 PM)  Pain Rating Post Med Admin: 0 (1/8/2025  1:20 PM)  Lexi Score: 9 (1/8/2025 12:20 PM)

## (undated) DEVICE — ELECTRODE REM PLYHSV RETURN 9

## (undated) DEVICE — SCISSOR 5MMX35CM DIRECT DRIVE

## (undated) DEVICE — TROCAR ENDOPATH XCEL 12X100MM

## (undated) DEVICE — SOL NACL IRR 1000ML BTL

## (undated) DEVICE — SYS SEE SHARP SCP ANTIFG LNG

## (undated) DEVICE — LINER GLOVE POWDERFREE SZ 6.5

## (undated) DEVICE — SET PNEUMOCLEAR HEAT HUM SE HF

## (undated) DEVICE — SOL IRRI STRL WATER 1000ML

## (undated) DEVICE — Device

## (undated) DEVICE — SUT MONOCYRL 4-0 PS2 UND

## (undated) DEVICE — BAG SPEC RETRV ENDO 4X6IN DISP

## (undated) DEVICE — ADHESIVE DERMABOND ADVANCED

## (undated) DEVICE — TUBE SUMP NASOGASTRIC 16FR

## (undated) DEVICE — GLOVE SURGICAL LATEX SZ 6.5

## (undated) DEVICE — LINER GLOVE POWDERFREE SZ 7

## (undated) DEVICE — SLEEVE SCD EXPRESS KNEE MEDIUM

## (undated) DEVICE — STRAP KNEE & BODY DISP 4X34IN

## (undated) DEVICE — DISSECTOR 5MM ENDOPATH

## (undated) DEVICE — ENDO GRASPER ETHICON 5DSG

## (undated) DEVICE — UNDERPAD DELUXE FLUFF 30X30IN

## (undated) DEVICE — BLANKET WARMING UPPER BODY

## (undated) DEVICE — COVER OVERHEAD SURG LT BLUE

## (undated) DEVICE — SUT VICRYL+ 27 UR-6 VIOL

## (undated) DEVICE — TROCAR ENDOPATH XCEL 11MM 10CM

## (undated) DEVICE — APPLIER CLIP ENDO MED/LG 10MM

## (undated) DEVICE — TROCAR ENDOPATH XCEL 5X100MM

## (undated) DEVICE — BLADE SURG CARBON STEEL SZ11

## (undated) DEVICE — PENCIL SMK EVAC CONNECTOR 10FT

## (undated) DEVICE — SYR 10CC LUER LOCK

## (undated) DEVICE — LINER SUCTION CANNISTER REGUGA

## (undated) DEVICE — APPLICATOR CHLORAPREP ORN 26ML